# Patient Record
Sex: FEMALE | Race: WHITE | Employment: UNEMPLOYED | URBAN - METROPOLITAN AREA
[De-identification: names, ages, dates, MRNs, and addresses within clinical notes are randomized per-mention and may not be internally consistent; named-entity substitution may affect disease eponyms.]

---

## 2018-06-14 PROCEDURE — 88305 TISSUE EXAM BY PATHOLOGIST: CPT | Performed by: INTERNAL MEDICINE

## 2018-06-15 ENCOUNTER — HOSPITAL ENCOUNTER (OUTPATIENT)
Dept: LAB | Age: 83
Discharge: HOME | End: 2018-06-15

## 2019-06-11 ENCOUNTER — HOSPITAL ENCOUNTER (OUTPATIENT)
Dept: MRI IMAGING | Age: 84
Discharge: HOME | End: 2019-06-11
Attending: OTOLARYNGOLOGY
Payer: MEDICARE

## 2019-06-11 DIAGNOSIS — H93.11 TINNITUS, RIGHT EAR: ICD-10-CM

## 2019-06-11 PROCEDURE — A9575 INJ GADOTERATE MEGLUMI 0.1ML: HCPCS | Performed by: OTOLARYNGOLOGY

## 2019-06-11 PROCEDURE — 70553 MRI BRAIN STEM W/O & W/DYE: CPT

## 2019-06-11 PROCEDURE — 74011250636 HC RX REV CODE- 250/636: Performed by: OTOLARYNGOLOGY

## 2019-06-11 RX ORDER — SODIUM CHLORIDE 0.9 % (FLUSH) 0.9 %
10 SYRINGE (ML) INJECTION
Status: COMPLETED | OUTPATIENT
Start: 2019-06-11 | End: 2019-06-11

## 2019-06-11 RX ORDER — GADOTERATE MEGLUMINE 376.9 MG/ML
12 INJECTION INTRAVENOUS
Status: COMPLETED | OUTPATIENT
Start: 2019-06-11 | End: 2019-06-11

## 2019-06-11 RX ADMIN — Medication 10 ML: at 18:31

## 2019-06-11 RX ADMIN — GADOTERATE MEGLUMINE 12 ML: 376.9 INJECTION INTRAVENOUS at 18:31

## 2021-07-15 ENCOUNTER — HOSPICE ADMISSION (OUTPATIENT)
Dept: HOSPICE | Facility: HOSPICE | Age: 86
End: 2021-07-15

## 2021-07-18 ENCOUNTER — HOME CARE VISIT (OUTPATIENT)
Dept: HOSPICE | Facility: HOSPICE | Age: 86
End: 2021-07-18

## 2021-07-20 ENCOUNTER — HOSPITAL ENCOUNTER (OUTPATIENT)
Age: 86
Setting detail: OBSERVATION
Discharge: ANOTHER HOSPITAL | End: 2021-07-20
Attending: EMERGENCY MEDICINE | Admitting: EMERGENCY MEDICINE
Payer: MEDICARE

## 2021-07-20 ENCOUNTER — APPOINTMENT (OUTPATIENT)
Dept: CT IMAGING | Age: 86
End: 2021-07-20
Attending: EMERGENCY MEDICINE
Payer: MEDICARE

## 2021-07-20 ENCOUNTER — HOSPITAL ENCOUNTER (OUTPATIENT)
Age: 86
Setting detail: OBSERVATION
Discharge: HOSPICE | End: 2021-07-23
Attending: HOSPITALIST | Admitting: HOSPITALIST
Payer: MEDICARE

## 2021-07-20 VITALS
DIASTOLIC BLOOD PRESSURE: 53 MMHG | HEART RATE: 88 BPM | TEMPERATURE: 97.5 F | SYSTOLIC BLOOD PRESSURE: 105 MMHG | RESPIRATION RATE: 18 BRPM | OXYGEN SATURATION: 100 %

## 2021-07-20 DIAGNOSIS — R11.0 NAUSEA WITHOUT VOMITING: ICD-10-CM

## 2021-07-20 DIAGNOSIS — K59.00 CONSTIPATION, UNSPECIFIED CONSTIPATION TYPE: ICD-10-CM

## 2021-07-20 DIAGNOSIS — K92.2 GASTROINTESTINAL HEMORRHAGE, UNSPECIFIED GASTROINTESTINAL HEMORRHAGE TYPE: Primary | ICD-10-CM

## 2021-07-20 DIAGNOSIS — R10.13 EPIGASTRIC PAIN: ICD-10-CM

## 2021-07-20 DIAGNOSIS — C16.9 MALIGNANT NEOPLASM OF STOMACH, UNSPECIFIED LOCATION (HCC): ICD-10-CM

## 2021-07-20 DIAGNOSIS — Z51.5 ENCOUNTER FOR PALLIATIVE CARE: ICD-10-CM

## 2021-07-20 DIAGNOSIS — D62 ACUTE BLOOD LOSS ANEMIA: ICD-10-CM

## 2021-07-20 PROBLEM — R53.1 WEAKNESS: Status: ACTIVE | Noted: 2021-07-20

## 2021-07-20 LAB
ALBUMIN SERPL-MCNC: 1.5 G/DL (ref 3.2–4.6)
ALBUMIN/GLOB SERPL: 0.4 {RATIO} (ref 1.2–3.5)
ALP SERPL-CCNC: 91 U/L (ref 50–136)
ALT SERPL-CCNC: 12 U/L (ref 12–65)
ANION GAP SERPL CALC-SCNC: 7 MMOL/L (ref 7–16)
AST SERPL-CCNC: 39 U/L (ref 15–37)
BASOPHILS # BLD: 0.1 K/UL (ref 0–0.2)
BASOPHILS NFR BLD: 0 % (ref 0–2)
BILIRUB SERPL-MCNC: 0.3 MG/DL (ref 0.2–1.1)
BUN SERPL-MCNC: 18 MG/DL (ref 8–23)
CALCIUM SERPL-MCNC: 7.4 MG/DL (ref 8.3–10.4)
CHLORIDE SERPL-SCNC: 105 MMOL/L (ref 98–107)
CO2 SERPL-SCNC: 21 MMOL/L (ref 21–32)
CREAT SERPL-MCNC: 0.62 MG/DL (ref 0.6–1)
DIFFERENTIAL METHOD BLD: ABNORMAL
EOSINOPHIL # BLD: 0.1 K/UL (ref 0–0.8)
EOSINOPHIL NFR BLD: 0 % (ref 0.5–7.8)
ERYTHROCYTE [DISTWIDTH] IN BLOOD BY AUTOMATED COUNT: 17.7 % (ref 11.9–14.6)
GLOBULIN SER CALC-MCNC: 3.6 G/DL (ref 2.3–3.5)
GLUCOSE SERPL-MCNC: 120 MG/DL (ref 65–100)
HCT VFR BLD AUTO: 25.8 % (ref 35.8–46.3)
HGB BLD-MCNC: 7.6 G/DL (ref 11.7–15.4)
IMM GRANULOCYTES # BLD AUTO: 0.2 K/UL (ref 0–0.5)
IMM GRANULOCYTES NFR BLD AUTO: 1 % (ref 0–5)
LIPASE SERPL-CCNC: 155 U/L (ref 73–393)
LYMPHOCYTES # BLD: 1 K/UL (ref 0.5–4.6)
LYMPHOCYTES NFR BLD: 5 % (ref 13–44)
MCH RBC QN AUTO: 30.6 PG (ref 26.1–32.9)
MCHC RBC AUTO-ENTMCNC: 29.5 G/DL (ref 31.4–35)
MCV RBC AUTO: 104 FL (ref 79.6–97.8)
MONOCYTES # BLD: 1.5 K/UL (ref 0.1–1.3)
MONOCYTES NFR BLD: 7 % (ref 4–12)
NEUTS SEG # BLD: 17.2 K/UL (ref 1.7–8.2)
NEUTS SEG NFR BLD: 86 % (ref 43–78)
NRBC # BLD: 0 K/UL (ref 0–0.2)
PLATELET # BLD AUTO: 394 K/UL (ref 150–450)
PMV BLD AUTO: 9.2 FL (ref 9.4–12.3)
POTASSIUM SERPL-SCNC: 4.5 MMOL/L (ref 3.5–5.1)
PROT SERPL-MCNC: 5.1 G/DL (ref 6.3–8.2)
RBC # BLD AUTO: 2.48 M/UL (ref 4.05–5.2)
SODIUM SERPL-SCNC: 133 MMOL/L (ref 136–145)
WBC # BLD AUTO: 20 K/UL (ref 4.3–11.1)

## 2021-07-20 PROCEDURE — 96361 HYDRATE IV INFUSION ADD-ON: CPT

## 2021-07-20 PROCEDURE — 86901 BLOOD TYPING SEROLOGIC RH(D): CPT

## 2021-07-20 PROCEDURE — 36415 COLL VENOUS BLD VENIPUNCTURE: CPT

## 2021-07-20 PROCEDURE — 65270000029 HC RM PRIVATE

## 2021-07-20 PROCEDURE — 74011000636 HC RX REV CODE- 636: Performed by: EMERGENCY MEDICINE

## 2021-07-20 PROCEDURE — 83690 ASSAY OF LIPASE: CPT

## 2021-07-20 PROCEDURE — 99218 HC RM OBSERVATION: CPT

## 2021-07-20 PROCEDURE — 85025 COMPLETE CBC W/AUTO DIFF WBC: CPT

## 2021-07-20 PROCEDURE — 74011250636 HC RX REV CODE- 250/636: Performed by: EMERGENCY MEDICINE

## 2021-07-20 PROCEDURE — 99285 EMERGENCY DEPT VISIT HI MDM: CPT

## 2021-07-20 PROCEDURE — 96360 HYDRATION IV INFUSION INIT: CPT

## 2021-07-20 PROCEDURE — 80053 COMPREHEN METABOLIC PANEL: CPT

## 2021-07-20 PROCEDURE — 74011000258 HC RX REV CODE- 258: Performed by: EMERGENCY MEDICINE

## 2021-07-20 PROCEDURE — 74177 CT ABD & PELVIS W/CONTRAST: CPT

## 2021-07-20 PROCEDURE — 94660 CPAP INITIATION&MGMT: CPT

## 2021-07-20 RX ORDER — SODIUM CHLORIDE 0.9 % (FLUSH) 0.9 %
10 SYRINGE (ML) INJECTION
Status: COMPLETED | OUTPATIENT
Start: 2021-07-20 | End: 2021-07-20

## 2021-07-20 RX ORDER — SODIUM CHLORIDE 0.9 % (FLUSH) 0.9 %
5-10 SYRINGE (ML) INJECTION EVERY 8 HOURS
Status: DISCONTINUED | OUTPATIENT
Start: 2021-07-20 | End: 2021-07-20 | Stop reason: HOSPADM

## 2021-07-20 RX ORDER — SODIUM CHLORIDE 0.9 % (FLUSH) 0.9 %
5-10 SYRINGE (ML) INJECTION AS NEEDED
Status: DISCONTINUED | OUTPATIENT
Start: 2021-07-20 | End: 2021-07-20 | Stop reason: HOSPADM

## 2021-07-20 RX ADMIN — SODIUM CHLORIDE 250 ML/HR: 9 INJECTION, SOLUTION INTRAVENOUS at 15:43

## 2021-07-20 RX ADMIN — Medication 10 ML: at 17:44

## 2021-07-20 RX ADMIN — DIATRIZOATE MEGLUMINE AND DIATRIZOATE SODIUM 15 ML: 660; 100 LIQUID ORAL; RECTAL at 15:44

## 2021-07-20 RX ADMIN — SODIUM CHLORIDE 100 ML: 900 INJECTION, SOLUTION INTRAVENOUS at 17:44

## 2021-07-20 RX ADMIN — IOPAMIDOL 100 ML: 755 INJECTION, SOLUTION INTRAVENOUS at 17:44

## 2021-07-20 NOTE — ED TRIAGE NOTES
Arrives via ems from home. Hx colon cx. Pt having constipation x4 days. Attempted enema and seen blood in stool. Pt also reports lower abdominal pain and generalized weakness.    EMS /60 HR 90 O2 100 %  Pt reports dark tarry stools x1 month

## 2021-07-20 NOTE — ED PROVIDER NOTES
69-year-old female with a history of gastric adenocarcinoma presents with dark stools for the past 3 weeks with worsening generalized weakness and lightheadedness. All of her prior care is through [facility], but states she wanted to come to our facility because she is [address]. She tried an enema for constipation and noticed some bright red blood. She has diffuse abdominal pain. She has required multiple transfusions in the past due to ongoing bleeding from gastric cancer. According to the last note, she has recently been referred to hospice. She did not follow through with this because she wants to go home to [facility] [place]. Family plans on flying her home in 2 days. She denies fever, cough, shortness of breath, chest pain, diarrhea, vomiting. Oncology note Dr Dione Dozier 7/14/21:  Ra Story is a pleasant 81 y.o. female who presents to the [facility] for follow up of gastric adenocarcinoma. She continues to have difficulty hearing (left > right) secondary to bilateral tinnitus. She also continues to experience intermittent abdominal pain with black stools. She has an appointment with Dr. Rima Pop on 7/22/21 to discuss radiation to her known gastric mass. She remains on Xeloda but she feels like it may be making her symptoms worse. She also remains on Eliquis 2.5mg BID. She notes that she has felt generally weak at times she she says that it difficult for her to ambulate. She currently lives alone. She denies recent falls. She voices no additional complaints or concerns at this time. ASSESSMENT AND PLAN:  Gastric adenocarcinoma ([facility] [address] (Primary)  Assessment & Plan:  Locally advanced gastric adenocarcinoma, HER2 negative. She started FOLFOX but developed PAC thrombosis with associated pain/swelling that did not improve on Eliquis. Her PAC was subsequently removed. She was receiving XELOX, but developed acute oxaliplatin reaction with cramps and difficulty walking.  She is now on Xeloda monotherapy. We had a long discussion today about goals of care. She is feeling weaker, and isn't a good candidate for infusional chemotherapy. She would really like to get back to [facility] to spend time with her family. Alternatively, we could get Hospice involved to help with her care here. After our discussion, she would like to have Hospice here in [facility]. I'll make these arrangements today. Gastrointestinal hemorrhage, unspecified gastrointestinal hemorrhage type  Assessment & Plan:  She previously required PRBC transfusions and IV iron BEFORE requiring Eliquis, so I suspect any occult GI hemorrhage from her gastric mass will be exacerbated by her anticoagulation. I am concerned she could suffer life-threatening hemorrhage and we have discussed this in detail. S/p repeat IV iron for recurrent BRANDI. I decreased her Eliquis to 2.5 mg po BID several weeks ago. Iron deficiency anemia due to chronic blood loss  Assessment & Plan:  Likely due to gastric adenoCA associated bleeding. Worsened due to requirement for Eliquis. S/p prior transfusion and recent repeat IV iron. Decreased Eliquis to 2.5 mg po BID. 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 MRI unrevealing. Refer to ENT for evaluation. Xeloda is unlikely to be the culprit based on my experience and literature search on this possible association. \"          Abdominal Pain   Pertinent negatives include no fever, no diarrhea, no nausea, no vomiting, no headaches, no chest pain and no back pain. No past medical history on file. No past surgical history on file. No family history on file.     Social History     Socioeconomic History    Marital status:      Spouse name: Not on file    Number of children: Not on file    Years of education: Not on file    Highest education level: Not on file   Occupational History    Not on file   Tobacco Use    Smoking status: Never Smoker    Smokeless tobacco: Never Used   Substance and Sexual Activity    Alcohol use: Not Currently    Drug use: Not on file    Sexual activity: Not on file   Other Topics Concern    Not on file   Social History Narrative    Not on file     Social Determinants of Health     Financial Resource Strain:     Difficulty of Paying Living Expenses:    Food Insecurity:     Worried About Running Out of Food in the Last Year:     [address] in the Last Year:    Transportation Needs:     Lack of Transportation (Medical):  Lack of Transportation (Non-Medical):    Physical Activity:     Days of Exercise per Week:     Minutes of Exercise per Session:    Stress:     Feeling of Stress :    Social Connections:     Frequency of Communication with Friends and Family:     Frequency of Social Gatherings with Friends and Family:     Attends Taoism Services:     Active Member of Clubs or Organizations:     Attends Club or Organization Meetings:     Marital Status:    Intimate Partner Violence:     Fear of Current or Ex-Partner:     Emotionally Abused:     Physically Abused:     Sexually Abused: ALLERGIES: Atorvastatin calcium, Carvedilol, Clonazepam, Duloxetine hcl, Ezetimibe, Fluoxetine hcl, Gabapentin, Olanzapine, Pregabalin, Sertraline, Simvastatin, Colesevelam, Ezetimibe-simvastatin, Fenofibrate, Olanzapine-fluoxetine, and Penicillins    Review of Systems   Constitutional: Positive for fatigue. Negative for fever. HENT: Negative for hearing loss. Eyes: Negative for visual disturbance. Respiratory: Negative for cough and shortness of breath. Cardiovascular: Negative for chest pain. Gastrointestinal: Positive for abdominal pain and blood in stool. Negative for diarrhea, nausea and vomiting. Musculoskeletal: Negative for back pain. Skin: Negative for rash. Neurological: Negative for headaches. Psychiatric/Behavioral: Positive for dysphoric mood. Negative for confusion. All other systems reviewed and are negative. Vitals:    07/20/21 1245 07/20/21 1252   BP: 104/61    Pulse: 100 97   Resp: 18    Temp: 97.5 °F (36.4 °C)    SpO2: 98%             Physical Exam  Vitals and nursing note reviewed. Constitutional:       Appearance: Normal appearance. She is well-developed. HENT:      Head: Normocephalic and atraumatic. Nose: Nose normal.      Mouth/Throat:      Mouth: Mucous membranes are moist.   Eyes:      Pupils: Pupils are equal, round, and reactive to light. Cardiovascular:      Rate and Rhythm: Regular rhythm. Heart sounds: Normal heart sounds. Pulmonary:      Effort: Pulmonary effort is normal.      Breath sounds: Normal breath sounds. Abdominal:      Palpations: Abdomen is soft. Tenderness: There is generalized abdominal tenderness. Musculoskeletal:         General: No deformity. Normal range of motion. Cervical back: Normal range of motion and neck supple. Skin:     General: Skin is warm and dry. Coloration: Skin is pale. Neurological:      General: No focal deficit present. Mental Status: She is alert. Mental status is at baseline. Psychiatric:         Mood and Affect: Mood is depressed. Behavior: Behavior normal.          MDM  Number of Diagnoses or Management Options  Diagnosis management comments: Parts of this document were created using dragon voice recognition software. The chart has been reviewed but errors may still be present. I wore appropriate PPE throughout this patient's ED visit. Patricia Matthews MD, 3:15 PM    6:49 PM  Hb decreased from 9.3 on 7/14/21 to 7.6 today. No SBO on CT. dw hospitalist for admission.  Lives alone       Amount and/or Complexity of Data Reviewed  Clinical lab tests: ordered and reviewed (Results for orders placed or performed during the hospital encounter of 39/76/68  -METABOLIC PANEL, COMPREHENSIVE       Result                      Value             Ref Range           Sodium                      133 (L)           136 - 145 mm*       Potassium                   4.5               3.5 - 5.1 mm*       Chloride                    105               98 - 107 mmo*       CO2                         21                21 - 32 mmol*       Anion gap                   7                 7 - 16 mmol/L       Glucose                     120 (H)           65 - 100 mg/*       BUN                         18                8 - 23 MG/DL        Creatinine                  0.62              0.6 - 1.0 MG*       GFR est AA                  >60               >60 ml/min/1*       GFR est non-AA              >60               >60 ml/min/1*       Calcium                     7.4 (L)           8.3 - 10.4 M*       Bilirubin, total            0.3               0.2 - 1.1 MG*       ALT (SGPT)                  12                12 - 65 U/L         AST (SGOT)                  39 (H)            15 - 37 U/L         Alk.  phosphatase            91                50 - 136 U/L        Protein, total              5.1 (L)           6.3 - 8.2 g/*       Albumin                     1.5 (L)           3.2 - 4.6 g/*       Globulin                    3.6 (H)           2.3 - 3.5 g/*       A-G Ratio                   0.4 (L)           1.2 - 3.5      -LIPASE       Result                      Value             Ref Range           Lipase                      155               73 - 393 U/L   -CBC WITH AUTOMATED DIFF       Result                      Value             Ref Range           WBC                         20.0 (H)          4.3 - 11.1 K*       RBC                         2.48 (L)          4.05 - 5.2 M*       HGB                         7.6 (L)           11.7 - 15.4 *       HCT                         25.8 (L)          35.8 - 46.3 %       MCV                         104.0 (H)         79.6 - 97.8 *       MCH                         30.6              26.1 - 32.9 *       MCHC                        29.5 (L)          31.4 - 35.0 *       RDW                         17.7 (H)          11.9 - 14.6 %       PLATELET                    394               150 - 450 K/*       MPV                         9.2 (L)           9.4 - 12.3 FL       ABSOLUTE NRBC               0.00              0.0 - 0.2 K/*       DF                          AUTOMATED                             NEUTROPHILS                 86 (H)            43 - 78 %           LYMPHOCYTES                 5 (L)             13 - 44 %           MONOCYTES                   7                 4.0 - 12.0 %        EOSINOPHILS                 0 (L)             0.5 - 7.8 %         BASOPHILS                   0                 0.0 - 2.0 %         IMMATURE GRANULOCYTES       1                 0.0 - 5.0 %         ABS. NEUTROPHILS            17.2 (H)          1.7 - 8.2 K/*       ABS. LYMPHOCYTES            1.0               0.5 - 4.6 K/*       ABS. MONOCYTES              1.5 (H)           0.1 - 1.3 K/*       ABS. EOSINOPHILS            0.1               0.0 - 0.8 K/*       ABS. BASOPHILS              0.1               0.0 - 0.2 K/*       ABS. IMM.  GRANS.            0.2               0.0 - 0.5 K/*  -TYPE & SCREEN       Result                      Value             Ref Range           Crossmatch Expiration                                         07/23/2021,2359       ABO/Rh(D)                   O POSITIVE                            Antibody screen             NEG                              )  Tests in the medicine section of CPT®: ordered and reviewed           Procedures

## 2021-07-21 PROBLEM — D64.9 SYMPTOMATIC ANEMIA: Status: ACTIVE | Noted: 2021-07-21

## 2021-07-21 PROBLEM — D62 ACUTE BLOOD LOSS ANEMIA: Status: ACTIVE | Noted: 2021-07-21

## 2021-07-21 PROBLEM — C16.9 GASTRIC CANCER (HCC): Status: ACTIVE | Noted: 2021-07-21

## 2021-07-21 LAB
HCT VFR BLD AUTO: 27 % (ref 35.8–46.3)
HGB BLD-MCNC: 8.3 G/DL (ref 11.7–15.4)
HISTORY CHECKED?,CKHIST: NORMAL

## 2021-07-21 PROCEDURE — P9016 RBC LEUKOCYTES REDUCED: HCPCS

## 2021-07-21 PROCEDURE — 99222 1ST HOSP IP/OBS MODERATE 55: CPT | Performed by: NURSE PRACTITIONER

## 2021-07-21 PROCEDURE — 96376 TX/PRO/DX INJ SAME DRUG ADON: CPT

## 2021-07-21 PROCEDURE — 74011000250 HC RX REV CODE- 250: Performed by: HOSPITALIST

## 2021-07-21 PROCEDURE — 74011000258 HC RX REV CODE- 258: Performed by: STUDENT IN AN ORGANIZED HEALTH CARE EDUCATION/TRAINING PROGRAM

## 2021-07-21 PROCEDURE — 86923 COMPATIBILITY TEST ELECTRIC: CPT

## 2021-07-21 PROCEDURE — 99218 HC RM OBSERVATION: CPT

## 2021-07-21 PROCEDURE — 96374 THER/PROPH/DIAG INJ IV PUSH: CPT

## 2021-07-21 PROCEDURE — 2709999900 HC NON-CHARGEABLE SUPPLY

## 2021-07-21 PROCEDURE — 36415 COLL VENOUS BLD VENIPUNCTURE: CPT

## 2021-07-21 PROCEDURE — 86901 BLOOD TYPING SEROLOGIC RH(D): CPT

## 2021-07-21 PROCEDURE — 85014 HEMATOCRIT: CPT

## 2021-07-21 PROCEDURE — 76450000000

## 2021-07-21 PROCEDURE — 96375 TX/PRO/DX INJ NEW DRUG ADDON: CPT

## 2021-07-21 PROCEDURE — 74011250637 HC RX REV CODE- 250/637: Performed by: HOSPITALIST

## 2021-07-21 PROCEDURE — C9113 INJ PANTOPRAZOLE SODIUM, VIA: HCPCS | Performed by: HOSPITALIST

## 2021-07-21 PROCEDURE — 74011250636 HC RX REV CODE- 250/636: Performed by: HOSPITALIST

## 2021-07-21 PROCEDURE — 74011250636 HC RX REV CODE- 250/636: Performed by: STUDENT IN AN ORGANIZED HEALTH CARE EDUCATION/TRAINING PROGRAM

## 2021-07-21 PROCEDURE — 36430 TRANSFUSION BLD/BLD COMPNT: CPT

## 2021-07-21 RX ORDER — ONDANSETRON 4 MG/1
4 TABLET, ORALLY DISINTEGRATING ORAL
Status: DISCONTINUED | OUTPATIENT
Start: 2021-07-21 | End: 2021-07-23 | Stop reason: HOSPADM

## 2021-07-21 RX ORDER — FLUOXETINE 10 MG/1
20 CAPSULE ORAL DAILY
Status: DISCONTINUED | OUTPATIENT
Start: 2021-07-22 | End: 2021-07-23 | Stop reason: HOSPADM

## 2021-07-21 RX ORDER — ONDANSETRON 2 MG/ML
4 INJECTION INTRAMUSCULAR; INTRAVENOUS
Status: DISCONTINUED | OUTPATIENT
Start: 2021-07-21 | End: 2021-07-23 | Stop reason: HOSPADM

## 2021-07-21 RX ORDER — FUROSEMIDE 20 MG/1
20 TABLET ORAL DAILY
COMMUNITY
End: 2021-07-23

## 2021-07-21 RX ORDER — SODIUM CHLORIDE 0.9 % (FLUSH) 0.9 %
5-40 SYRINGE (ML) INJECTION EVERY 8 HOURS
Status: DISCONTINUED | OUTPATIENT
Start: 2021-07-21 | End: 2021-07-23 | Stop reason: HOSPADM

## 2021-07-21 RX ORDER — SODIUM CHLORIDE 9 MG/ML
250 INJECTION, SOLUTION INTRAVENOUS AS NEEDED
Status: DISCONTINUED | OUTPATIENT
Start: 2021-07-21 | End: 2021-07-21

## 2021-07-21 RX ORDER — SODIUM CHLORIDE 9 MG/ML
250 INJECTION, SOLUTION INTRAVENOUS AS NEEDED
Status: DISCONTINUED | OUTPATIENT
Start: 2021-07-21 | End: 2021-07-23 | Stop reason: HOSPADM

## 2021-07-21 RX ORDER — ACETAMINOPHEN 325 MG/1
650 TABLET ORAL
Status: DISCONTINUED | OUTPATIENT
Start: 2021-07-21 | End: 2021-07-23 | Stop reason: HOSPADM

## 2021-07-21 RX ORDER — ACETAMINOPHEN 650 MG/1
650 SUPPOSITORY RECTAL
Status: DISCONTINUED | OUTPATIENT
Start: 2021-07-21 | End: 2021-07-23 | Stop reason: HOSPADM

## 2021-07-21 RX ORDER — POLYETHYLENE GLYCOL 3350 17 G/17G
17 POWDER, FOR SOLUTION ORAL DAILY PRN
Status: DISCONTINUED | OUTPATIENT
Start: 2021-07-21 | End: 2021-07-23 | Stop reason: HOSPADM

## 2021-07-21 RX ORDER — LEVOTHYROXINE SODIUM 100 UG/1
100 TABLET ORAL
Status: DISCONTINUED | OUTPATIENT
Start: 2021-07-21 | End: 2021-07-23 | Stop reason: HOSPADM

## 2021-07-21 RX ORDER — FLUOXETINE 10 MG/1
10 CAPSULE ORAL DAILY
Status: DISCONTINUED | OUTPATIENT
Start: 2021-07-21 | End: 2021-07-21

## 2021-07-21 RX ORDER — SODIUM CHLORIDE 0.9 % (FLUSH) 0.9 %
5-40 SYRINGE (ML) INJECTION AS NEEDED
Status: DISCONTINUED | OUTPATIENT
Start: 2021-07-21 | End: 2021-07-23 | Stop reason: HOSPADM

## 2021-07-21 RX ORDER — LOSARTAN POTASSIUM 25 MG/1
25 TABLET ORAL DAILY
Status: DISCONTINUED | OUTPATIENT
Start: 2021-07-21 | End: 2021-07-21

## 2021-07-21 RX ORDER — OXYCODONE HYDROCHLORIDE 5 MG/1
10 TABLET ORAL
Status: DISCONTINUED | OUTPATIENT
Start: 2021-07-21 | End: 2021-07-23 | Stop reason: HOSPADM

## 2021-07-21 RX ADMIN — SODIUM CHLORIDE 40 MG: 9 INJECTION, SOLUTION INTRAMUSCULAR; INTRAVENOUS; SUBCUTANEOUS at 09:11

## 2021-07-21 RX ADMIN — SODIUM CHLORIDE 125 MG: 9 INJECTION, SOLUTION INTRAVENOUS at 14:51

## 2021-07-21 RX ADMIN — LEVOTHYROXINE SODIUM 100 MCG: 0.1 TABLET ORAL at 06:15

## 2021-07-21 RX ADMIN — ONDANSETRON 4 MG: 4 TABLET, ORALLY DISINTEGRATING ORAL at 10:13

## 2021-07-21 RX ADMIN — Medication 10 ML: at 06:14

## 2021-07-21 RX ADMIN — SODIUM CHLORIDE 80 MG: 9 INJECTION, SOLUTION INTRAMUSCULAR; INTRAVENOUS; SUBCUTANEOUS at 00:57

## 2021-07-21 RX ADMIN — Medication 10 ML: at 00:58

## 2021-07-21 RX ADMIN — Medication 10 ML: at 15:11

## 2021-07-21 RX ADMIN — OXYCODONE 10 MG: 5 TABLET ORAL at 00:57

## 2021-07-21 RX ADMIN — OXYCODONE 10 MG: 5 TABLET ORAL at 23:44

## 2021-07-21 NOTE — PROGRESS NOTES
07/20/21 7183   Dual Skin Pressure Injury Assessment   Dual Skin Pressure Injury Assessment WDL   Second Care Provider (Based on [address] [place] [address]) Migdalia Goldmann, RN    Skin Integumentary   Skin Integumentary (WDL) WDL    Pressure  Injury Documentation No Pressure Injury Noted-Pressure Ulcer Prevention Initiated

## 2021-07-21 NOTE — ED NOTES
TRANSFER - OUT REPORT:    Verbal report given on Nicanor Riley  being transferred to [facility](unit) for routine progression of care       Report consisted of patients Situation, Background, Assessment and   Recommendations(SBAR). Information from the following report(s) SBAR was reviewed with the receiving nurse. Lines:   Peripheral IV 07/20/21 Left Arm (Active)   Site Assessment Clean, dry, & intact 07/20/21 1509   Phlebitis Assessment 0 07/20/21 1509   Infiltration Assessment 0 07/20/21 1509   Dressing Status Clean, dry, & intact 07/20/21 1509        Opportunity for questions and clarification was provided.       Patient transported with:   [employer]ig [employer]

## 2021-07-21 NOTE — PROGRESS NOTES
Blood consent form signed and in chart. Patient had small amount of emesis, reddish tinge and coffee ground. Dr. Bandar Balderas informed.

## 2021-07-21 NOTE — CONSULTS
Palliative Care    Patient: Venu Maurice MRN: [id number]  SSN: [id number]    YOB: 1935  Age: 81 y.o. Sex: female       Date of Request: 7/21/2021  Date of Consult:  7/21/2021  Reason for Consult:  end stage disease  Requesting Physician: Dr. Christina Smith     Assessment/Plan:     Principal Diagnosis:    Nausea/Vomiting  R11.2  Additional Diagnoses:   · Pain, abdomen  R10.9  · Counseling, Encounter for Medical Advice  Z71.9  · Encounter for Palliative Care  Z51.5    Palliative Performance Scale (PPS):  PPS: 60    Medical Decision Making:   Reviewed and summarized chart from admission to present. Discussed case with appropriate providers: primary RN; Solitario Dave MDiv  Reviewed laboratory and x-ray data. Patient resting in bed, no visitor present. Patient reports mild nausea; no Zofran given recently, will ask RN to give prn Zofran. She reports her pain is resolved. She received one dose of oxycodone around 0100. Patient shares of her desire to go home to [facility] for end of life care. Her cousin is coming today or tomorrow, and she hopes to leave for [facility] in about a week. I attempted to discuss hospice with her in case she does not feel well enough to make this trip. She declines, adamant that she wants to return to [facility]. Validated patient's hope. Patient brings up her wishes regarding resuscitation, and states that she does not want chest compression or to be placed on a ventilator. She is agreeable to DNR status in her EMR. She expresses desire to receive Sacrament of the Sick by Father Surinder Hussein at [facility], as she has known him for years. This request was communicated with spiritual care team.    Patient states that two friends will be visiting later today, and she would like to discuss things with them. Recommend further education on benefits of having home hospice in place, even for the next week, and then discharging when she leaves for [facility].       Will discuss findings with members of the interdisciplinary team.      Thank you for this referral.          .    Subjective:     History obtained from:  Patient, Care Provider and Chart    Chief Complaint: Nausea  History of Present Illness:  Ms. Wu Onofre is an 80 yo female with PMH of gastric cancer, previously treated at [facility]. She is no longer receiving chemotherapy and plans to go to [facility] for end of life care. She presented to [facility] ER on 7/20 with 4 day history of constipation and blood in her stool. CT showed known gastric mass. Patient with hgb of 7.6. She was admitted for further management. Advance Directive: Yes       Code Status:  DNR            Health Care Power of : Yes - Copy of [address] on file. No past medical history on file. No past surgical history on file. No family history on file. Social History     Tobacco Use    Smoking status: Never Smoker    Smokeless tobacco: Never Used   Substance Use Topics    Alcohol use: Not Currently     Prior to Admission medications    Medication Sig Start Date End Date Taking? Authorizing Provider   furosemide (Lasix) 20 mg tablet Take 20 mg by mouth daily. Indications: visible water retention   Yes Provider, Historical   levothyroxine (SYNTHROID) 100 mcg tablet Take 80 mcg by mouth Daily (before breakfast). Yes Other, MD Ed   losartan (COZAAR) 25 mg tablet Take  by mouth daily. Patient not taking: Reported on 7/21/2021    Nubia, MD Ed   FLUoxetine (PROZAC) 20 mg capsule Take  by mouth daily.   Patient not taking: Reported on 7/21/2021    Other, MD Ed       Allergies   Allergen Reactions    Atorvastatin Calcium Myalgia     Muscle pain    Carvedilol Other (comments)     Pain and numbness in feet    Clonazepam Myalgia     Muscle pain    Duloxetine Hcl Nausea and Vomiting     Gi upset    Ezetimibe Myalgia     Muscle cramps    Fluoxetine Hcl Other (comments)     Dry mouth    Gabapentin Other (comments)     ineffective    Olanzapine Other (comments)     Dry mouth    Pregabalin Other (comments)     confusion    Sertraline Other (comments)     Anxiety,throbbing    Simvastatin Myalgia     Muscle aches and pain    Colesevelam Other (comments)     Heartburn    Ezetimibe-Simvastatin Myalgia     Muscle cramps    Fenofibrate Rash    Olanzapine-Fluoxetine Other (comments)     Dry mouth    Penicillins Rash        Review of Systems:  A comprehensive review of systems was negative except for:   Gastrointestinal: Positive for abdominal pain (controlled) and nausea. Objective:     Visit Vitals  BP (!) 95/53 (BP 1 Location: Right upper arm, BP Patient Position: At rest)   Pulse 76   Temp 97.6 °F (36.4 °C)   Resp 18   SpO2 98%        Physical Exam:    General:  Cooperative. No acute distress. Eyes:  Conjunctivae/corneas clear. Nose: Nares normal. Septum midline. Neck: Supple, symmetrical, trachea midline. Lungs:   Unlabored   Heart:  Regular rate and rhythm. Abdomen:   Soft, non-tender, non-distended. Extremities: Normal, atraumatic, no cyanosis or edema. Skin: Skin color, texture, turgor normal. No rash. Neurologic: Nonfocal.   Psych: Alert and oriented.       Assessment:     Hospital Problems  Date Reviewed: 7/10/2019        Codes Class Noted POA    Acute blood loss anemia ICD-10-CM: D62  ICD-9-CM: 285.1  7/21/2021 Unknown        [facility] ICD-10-CM: C16.9  ICD-9-CM: 151.9  7/21/2021 Unknown        Symptomatic anemia ICD-10-CM: D64.9  ICD-9-CM: 285.9  7/21/2021 Unknown              Signed By: Sonia Medellin NP     July 21, 2021

## 2021-07-21 NOTE — PROGRESS NOTES
TRANSFER - IN REPORT:    Verbal report received from Wilfredo Tavares [facility] (name) on Cale Olmedo  being received from [facility] ER (unit) for routine progression of care      Report consisted of patients Situation, Background, Assessment and   Recommendations(SBAR). Information from the following report(s) SBAR, Kardex, ED Summary, [facility] and Recent Results was reviewed with the receiving nurse. Opportunity for questions and clarification was provided. Assessment completed upon patients arrival to unit and care assumed.

## 2021-07-21 NOTE — PROGRESS NOTES
Problem: Falls - Risk of  Goal: *Absence of Falls  Description: Document [place] Fall Risk and appropriate interventions in the flowsheet.   Outcome: Progressing Towards Goal  Note: Fall Risk Interventions:  Mobility Interventions: Patient to call before getting OOB         Medication Interventions: Patient to call before getting OOB    Elimination Interventions: Patient to call for help with toileting needs, Call light in reach

## 2021-07-21 NOTE — PROGRESS NOTES
Hospitalist Progress Note     Name:  Kristi Beltran  Age:85 y.o. Sex:female   :  1935    MRN:  [id number]   Admit Date:  2021    Presenting Complaint: No chief complaint on file. Initial Admission Diagnosis: Symptomatic anemia [D64.9]  Gastric cancer (HCC) [C16.9]  Acute blood loss anemia [D62]     Hospital Course/Interval history:     81years old female with history of gastric adenocarcinoma, history of chemotherapy for gastric cancer, currently decided on hospice, currently getting hospice at home presented to emergency room with generalized weakness going on for past 2 weeks duration. Still having dark-colored stools around the same amount. Patient reports she was following at [facility], was getting chemotherapy but not getting any chemotherapy anymore as patient is here for hospice. Main reason for visit today is generalized weakness, would like to get a blood transfusion to improve her hemoglobin so she could fly back to her country to spend the rest of time there. Denies any chest pain, shortness of breath. Denies any nausea, vomiting, hematemesis. Denies any hematuria. Patient was initially evaluated at [facility] emergency room, due to lack of beds patient is transferred here. Subjective (21):    Seen and examined at bedside this morning. No acute vents overnight. Patient does admit to slight lightheadedness, dizziness, fatigue and shortness of breath. She denies any chest pain or pressure. She states that her abdominal pain is fairly well controlled. She did have one episode of emesis with small amount of blood present this morning. She has been having melanotic stools but denies any bowel movements here in the hospital yet. Denies any fevers or chills. Assessment & Plan     Acute blood loss anemia/symptomatic anemia; will order 1 unit of blood transfusion, currently not on any anticoagulation anymore, used to be on Eliquis.   Patient is not currently on hospice for gastric cancer, but planning to fly to [facility] for end-of-life care. As patient has history of terminal cancer which was fully worked up I will hold on GI consultation, patient is hemodynamically stable at this time, currently patient wanting conservative management. Discussed plan of care extensively with patient at bedside to give 1 unit PRBC, IV iron and monitor H&H. At that point will be able to determine if it is safe for patient to get on long flight to [facility] with family member. Expresses understanding with hopes that she will be able to go back home to [facility] to be with family. Palliative care consulted     Generalized weakness: Likely secondary to anemia. 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 Will treat conservatively with supporitive care. Pain and nausea meds ordered prn     Hypothyroidism: Continue on levothyroxine.     Hypertension: Hold home losartan with low-normal BP's     Depression: Continue on home dose of SSRIs. Dispo/Discharge Planning:  Pending, possible discharge within next 1-2 days. Diet:  ADULT DIET Clear Liquid  DVT PPx: SCDs  Code status: DNR    Objective:     Patient Vitals for the past 24 hrs:   Temp Pulse Resp BP SpO2   07/21/21 1552 97.8 °F (36.6 °C) 76 18 (!) 100/54 98 %   07/21/21 1244 97.5 °F (36.4 °C) 70 18 (!) 95/48 100 %   07/21/21 1140 97.7 °F (36.5 °C) 70 18 (!) 96/49 100 %   07/21/21 1107 97.6 °F (36.4 °C) 76 18 (!) 98/51 98 %   07/21/21 1040 (!) 95.9 °F (35.5 °C) 75 18 (!) 97/51 100 %   07/21/21 1018 (!) 96.1 °F (35.6 °C) 76 18 (!) 101/55 98 %   07/21/21 0750 97.6 °F (36.4 °C) 76 18 (!) 95/53 98 %   07/21/21 0345 98.5 °F (36.9 °C) 80 20 99/60 99 %   07/21/21 0001 99.5 °F (37.5 °C) 87 18 (!) 115/58 98 %     Oxygen Therapy  O2 Sat (%): 98 % (07/21/21 1552)  O2 Device: None (Room air) (07/20/21 5761)    There is no height or weight on file to calculate BMI.     Physical Exam:   General:  No acute distress, speaking in full sentences, no use of accessory muscles. Mildly pale  Lungs:  Clear to auscultation bilaterally   CV:  Regular rate and rhythm with normal S1 and S2   Abdomen:  Soft, nontender, nondistended, normoactive bowel sounds   Extremities:  No cyanosis clubbing or edema   Neuro:  Nonfocal, A&O x3   Psych:  Normal affect     Data Review:  I have reviewed all labs, meds, and studies from the last 24 hours:    Labs:    Recent Results (from the past 24 hour(s))   RBC, ALLOCATE    Collection Time: 07/21/21 12:30 AM   Result Value Ref Range    HISTORY CHECKED? Historical check performed    TYPE & SCREEN    Collection Time: 07/21/21  6:01 AM   Result Value Ref Range    Crossmatch Expiration 07/24/2021,2359     ABO/Rh(D) Danny Bustos POSITIVE     Antibody screen NEG     Unit number [id number]     Blood component type RC LR     Unit division 00     Status of unit ISSUED     Crossmatch result Compatible        All Micro Results     None          EKG Results     None          Other Studies:  CT ABD PELV W CONT    Result Date: 7/20/2021  CT OF THE ABDOMEN AND PELVIS WITH CONTRAST:          CLINICAL HISTORY:   Abdominal pain with no bowel movement for days in a patient with known gastric cancer. TECHNIQUE:  Oral and nonionic intravenous contrast was administered, and the abdomen and pelvis were scanned with spiral technique. Radiation dose reduction was achieved using one or all of the following techniques: automated exposure control, weight-based dosing, iterative reconstruction. COMPARISON:  Report of abdominopelvic CT dated May 12, 2021 from [facility] without images for direct comparison. CT ABDOMEN FINDINGS:  The lung bases are clear as imaged. A large irregular mass centered at the lesser curvature of the stomach has a central area of low attenuation suggesting necrosis.   The irregular mass is difficult to accurately measure but is approximately 10.8 cm transverse x 7.5 cm in AP x 10.0 cm craniocaudal compared with 4.2 x 5.4 x 5.7 cm reported on May 12. No vascular invasion or thrombosis is evident. The liver, spleen, pancreas, adrenals, and kidneys appear unremarkable. The transverse colon is displaced inferiorly by the gastric mass, but there is no evidence of small bowel or colonic CT PELVIS FINDINGS:   No pathologically enlarged lymph nodes or free fluid is evident. Calcified uterine fibroids are again noted. No adnexal mass is seen. Bone windows demonstrate no definite aggressive process, accounting for degenerative changes and a large sacral cyst.     1.  Approximately 11 cm irregular gastric mass centered at the lesser curvature is increased from 5.7 cm reported on Kamala CT dated May 12, 2021. 2.  Displacement of the transverse colon without evidence of invasion or obstruction. This case was reviewed in consultation with colleagues.       Current Meds:   Current Facility-Administered Medications   Medication Dose Route Frequency    sodium chloride (NS) flush 5-40 mL  5-40 mL IntraVENous Q8H    sodium chloride (NS) flush 5-40 mL  5-40 mL IntraVENous PRN    acetaminophen (TYLENOL) tablet 650 mg  650 mg Oral Q6H PRN    Or    acetaminophen (TYLENOL) suppository 650 mg  650 mg Rectal Q6H PRN    polyethylene glycol (MIRALAX) packet 17 g  17 g Oral DAILY PRN    ondansetron (ZOFRAN ODT) tablet 4 mg  4 mg Oral Q8H PRN    Or    ondansetron (ZOFRAN) injection 4 mg  4 mg IntraVENous Q6H PRN    oxyCODONE IR (ROXICODONE) tablet 10 mg  10 mg Oral Q4H PRN    levothyroxine (SYNTHROID) tablet 100 mcg  100 mcg Oral ACB    pantoprazole (PROTONIX) 40 mg in 0.9% sodium chloride 10 mL injection  40 mg IntraVENous Q12H    0.9% sodium chloride infusion 250 mL  250 mL IntraVENous PRN    0.9% sodium chloride infusion 250 mL  250 mL IntraVENous PRN       Problem List:  Hospital Problems as of 7/21/2021 Date Reviewed: 7/10/2019        Codes Class Noted - Resolved POA    Acute blood loss anemia ICD-10-CM: D62  ICD-9-CM: 285.1  7/21/2021 - Present Unknown        [facility] ([facility] ICD-10-CM: C16.9  ICD-9-CM: 151.9  7/21/2021 - Present Unknown        Symptomatic anemia ICD-10-CM: D64.9  ICD-9-CM: 285.9  7/21/2021 - Present Unknown               Part of this note was written by using a voice dictation software and the note has been proof read but may still contain some grammatical/other typographical errors.     Signed By: Cecile Ward MD   [facility] Hospitalist Service    July 21, 2021  5:15 PM

## 2021-07-21 NOTE — H&P
[facility] Hospitalist History and Physical       Name:  Nirmala Meraz  Age:85 y.o. Sex:female   :  1935    MRN:  [id number]   PCP:  Patricia Power MD      Admit Date:  2021 11:42 PM   Chief Complaint: Generalized weakness, blood in stools. Reason for Admission:   Symptomatic anemia [D64.9]  Gastric cancer ([facility] [C16.9]  Acute blood loss anemia [D62]    Assessment & Plan:     Acute blood loss anemia/symptomatic anemia; will order 1 unit of blood transfusion, currently not on any anticoagulation anymore, used to be on Eliquis. Patient is on hospice for gastric cancer, planning to fly to [facility] to spend time with family. As patient has history of terminal cancer which was fully worked up I will hold on GI consultation, patient is hemodynamically stable at this time, currently patient thinking about conservative management. Generalized weakness: Likely secondary to anemia. Terminal gastric cancer: Right chemotherapy, not very successful, currently on hospice. Will treat conservatively. Hypothyroidism: Continue on levothyroxine. Hypertension: Continue on losartan. Depression: Continue on home dose of SSRIs. Disposition/Expected LOS: 3  Diet: DIET ADULT  VTE ppx: SCD  GI ppx: Protonix  Code status: Full Code  Surrogate decision-maker: Self      History of Presenting Illness:     81years old female with history of gastric adenocarcinoma, history of chemotherapy for gastric cancer, currently decided on hospice, currently getting hospice at home presented to emergency room with generalized weakness going on for past 2 weeks duration. Still having dark-colored stools around the same amount. Patient reports she was following at [facility], was getting chemotherapy but not getting any chemotherapy anymore as patient is here for hospice.   Main reason for visit today is generalized weakness, would like to get a blood transfusion to improve her hemoglobin so she could fly back to her country to spend the rest of time there. Denies any chest pain, shortness of breath. Denies any nausea, vomiting, hematemesis. Denies any hematuria. [facility], due to lack of beds patient is transferred here. Review of Systems:  A 14 point review of systems was taken and pertinent positive as per HPI. Past medical history: History of gastric cancer. Past surgical history: History of EGD. Social history: Used to work as a , no history of alcohol abuse, history of drug abuse. No history of smoking. Family History : History of hypertension runs in family. No family history on file.      Social History     Tobacco Use    Smoking status: Never Smoker    Smokeless tobacco: Never Used   Substance Use Topics    Alcohol use: Not Currently       Allergies   Allergen Reactions    Atorvastatin Calcium Myalgia     Muscle pain    Carvedilol Other (comments)     Pain and numbness in feet    Clonazepam Myalgia     Muscle pain    Duloxetine Hcl Nausea and Vomiting     Gi upset    Ezetimibe Myalgia     Muscle cramps    Fluoxetine Hcl Other (comments)     Dry mouth    Gabapentin Other (comments)     ineffective    Olanzapine Other (comments)     Dry mouth    Pregabalin Other (comments)     confusion    Sertraline Other (comments)     Anxiety,throbbing    Simvastatin Myalgia     Muscle aches and pain    Colesevelam Other (comments)     Heartburn    Ezetimibe-Simvastatin Myalgia     Muscle cramps    Fenofibrate Rash    Olanzapine-Fluoxetine Other (comments)     Dry mouth    Penicillins Rash       Immunization History   Administered Date(s) Administered    Tdap 08/08/2016         PTA Medications:  Current Outpatient Medications   Medication Instructions    FLUoxetine (PROZAC) 20 mg capsule Oral, DAILY    levothyroxine (SYNTHROID) 100 mcg, Oral, DAILY BEFORE BREAKFAST    losartan (COZAAR) 25 mg tablet Oral, DAILY       Objective: Patient Vitals for the past 24 hrs:   Temp Pulse Resp BP SpO2   07/21/21 0001 99.5 °F (37.5 °C) 87 18 (!) 115/58 98 %       Oxygen Therapy  O2 Sat (%): 98 % (07/21/21 0001)    There is no height or weight on file to calculate BMI. Physical Exam:    General:  No acute distress, speaking in full sentences  HEENT:  Pupils equal and reactive to light and accommodation, oropharynx is clear   Neck:   Supple, no lymphadenopathy, no JVD   Lungs:  Clear to auscultation bilaterally   CV:   Regular rate and rhythm with normal S1 and S2   Abdomen:  Soft, nontender, nondistended, normoactive bowel sounds   Extremities:  No cyanosis clubbing or edema   Neuro:  Nonfocal, A&O x3   Psych:  Normal mood and affect       Data Reviewed: I have reviewed all labs, meds, and studies. Recent Results (from the past 24 hour(s))   METABOLIC PANEL, COMPREHENSIVE    Collection Time: 07/20/21  1:07 PM   Result Value Ref Range    Sodium 133 (L) 136 - 145 mmol/L    Potassium 4.5 3.5 - 5.1 mmol/L    Chloride 105 98 - 107 mmol/L    CO2 21 21 - 32 mmol/L    Anion gap 7 7 - 16 mmol/L    Glucose 120 (H) 65 - 100 mg/dL    BUN 18 8 - 23 MG/DL    Creatinine 0.62 0.6 - 1.0 MG/DL    GFR est AA >60 >60 ml/min/1.73m2    GFR est non-AA >60 >60 ml/min/1.73m2    Calcium 7.4 (L) 8.3 - 10.4 MG/DL    Bilirubin, total 0.3 0.2 - 1.1 MG/DL    ALT (SGPT) 12 12 - 65 U/L    AST (SGOT) 39 (H) 15 - 37 U/L    Alk.  phosphatase 91 50 - 136 U/L    Protein, total 5.1 (L) 6.3 - 8.2 g/dL    Albumin 1.5 (L) 3.2 - 4.6 g/dL    Globulin 3.6 (H) 2.3 - 3.5 g/dL    A-G Ratio 0.4 (L) 1.2 - 3.5     LIPASE    Collection Time: 07/20/21  1:07 PM   Result Value Ref Range    Lipase 155 73 - 393 U/L   TYPE & SCREEN    Collection Time: 07/20/21  2:43 PM   Result Value Ref Range    Crossmatch Expiration 07/23/2021,2359     ABO/Rh(D) Ed Kings POSITIVE     Antibody screen NEG    CBC WITH AUTOMATED DIFF    Collection Time: 07/20/21  2:45 PM   Result Value Ref Range    WBC 20.0 (H) 4.3 - 11.1 K/uL    RBC 2.48 (L) 4.05 - 5.2 M/uL    HGB 7.6 (L) 11.7 - 15.4 g/dL    HCT 25.8 (L) 35.8 - 46.3 %    .0 (H) 79.6 - 97.8 FL    MCH 30.6 26.1 - 32.9 PG    MCHC 29.5 (L) 31.4 - 35.0 g/dL    RDW 17.7 (H) 11.9 - 14.6 %    PLATELET 091 283 - 347 K/uL    MPV 9.2 (L) 9.4 - 12.3 FL    ABSOLUTE NRBC 0.00 0.0 - 0.2 K/uL    DF AUTOMATED      NEUTROPHILS 86 (H) 43 - 78 %    LYMPHOCYTES 5 (L) 13 - 44 %    MONOCYTES 7 4.0 - 12.0 %    EOSINOPHILS 0 (L) 0.5 - 7.8 %    BASOPHILS 0 0.0 - 2.0 %    IMMATURE GRANULOCYTES 1 0.0 - 5.0 %    ABS. NEUTROPHILS 17.2 (H) 1.7 - 8.2 K/UL    ABS. LYMPHOCYTES 1.0 0.5 - 4.6 K/UL    ABS. MONOCYTES 1.5 (H) 0.1 - 1.3 K/UL    ABS. EOSINOPHILS 0.1 0.0 - 0.8 K/UL    ABS. BASOPHILS 0.1 0.0 - 0.2 K/UL    ABS. IMM. GRANS. 0.2 0.0 - 0.5 K/UL       EKG Results     None          All Micro Results     None          Other Studies:  CT ABD PELV W CONT    Result Date: 7/20/2021  CT OF THE ABDOMEN AND PELVIS WITH CONTRAST:          CLINICAL HISTORY:   Abdominal pain with no bowel movement for days in a patient with known gastric cancer. TECHNIQUE:  Oral and nonionic intravenous contrast was administered, and the abdomen and pelvis were scanned with spiral technique. Radiation dose reduction was achieved using one or all of the following techniques: automated exposure control, weight-based dosing, iterative reconstruction. COMPARISON:  Report of abdominopelvic CT dated May 12, 2021 from [facility] without images for direct comparison. CT ABDOMEN FINDINGS:  The lung bases are clear as imaged. A large irregular mass centered at the lesser curvature of the stomach has a central area of low attenuation suggesting necrosis. The irregular mass is difficult to accurately measure but is approximately 10.8 cm transverse x 7.5 cm in AP x 10.0 cm craniocaudal compared with 4.2 x 5.4 x 5.7 cm reported on May 12. No vascular invasion or thrombosis is evident.     The liver, spleen, pancreas, adrenals, and kidneys appear unremarkable. The transverse colon is displaced inferiorly by the gastric mass, but there is no evidence of small bowel or colonic CT PELVIS FINDINGS:   No pathologically enlarged lymph nodes or free fluid is evident. Calcified uterine fibroids are again noted. No adnexal mass is seen. Bone windows demonstrate no definite aggressive process, accounting for degenerative changes and a large sacral cyst.     1.  Approximately 11 cm irregular gastric mass centered at the lesser curvature is increased from 5.7 cm reported on [place] CT dated May 12, 2021. 2.  Displacement of the transverse colon without evidence of invasion or obstruction. This case was reviewed in consultation with colleagues.         Medications:  Medications Administered              Problem List:     Hospital Problems as of 7/21/2021 Date Reviewed: 7/10/2019        Codes Class Noted - Resolved POA    Acute blood loss anemia ICD-10-CM: D62  ICD-9-CM: 285.1  7/21/2021 - Present Unknown        [facility] ([facility] ICD-10-CM: C16.9  ICD-9-CM: 151.9  7/21/2021 - Present Unknown        Symptomatic anemia ICD-10-CM: D64.9  ICD-9-CM: 285.9  7/21/2021 - Present Unknown                 Signed By: Fran Frias MD   [employer] Hospitalist Service    July 21, 2021  4:22 PM

## 2021-07-21 NOTE — PROGRESS NOTES
Pt stated that she would like to change her code status to DNR. Jana Tran NP with palliative care was notified as well as Dr Jami Ruth.

## 2021-07-21 NOTE — PROGRESS NOTES
Request from patient for Anointing of the [address] with Karlie Jack who she knows well. Bairon Vargas notified and is on his way to [facility] patient. Emmett Marc M.Div.

## 2021-07-21 NOTE — PROGRESS NOTES
Care Management Interventions  Mode of Transport at Discharge: Self  Transition of Care Consult (CM Consult): Discharge Planning  Current Support Network: Lives Alone  Confirm Follow Up Transport: Self  Discharge Location  Discharge Placement: Unable to determine at this time    Randy reviewed chart. Pt is an 81 yr old female adm yesterday due to anemia/GI Bleeed/with history of Gastric Cancer. Pt lives alone and has been managing all her affairs indep. Pt has many friends who she calls on-one is at bedside and interpreted on occasion. Pt has received IV and PO chemo in the past. Pt informed that her Oncology Md was aware of her advancing of her cancer and stated if she wanted to go to [facility] that she needed to go within one week. Pt is receiving blood and fluids and is still hoping and planning to fly to [facility] as soon as the doctors will let me. Pt will need a COVID PCR (likely) in order to fly out of the country. LADY Perez placed order. Pt has a \"cousin\" which is really just a close friend who is flying here to meet up with her and then the plan is that they would fly back together. Pt informed the chemo affected her hearing and has made her legs weak. She reiterated that she wants to be a DNR and when the [facility] is ready to take her \"home\" she is ready. Randy offered support and encouragement.  Randy will continue to follow and assist. Anuradha Lazo

## 2021-07-21 NOTE — PROGRESS NOTES
Received patient from [facility] in stable condition. Pt in bed resting quietly. Resp even & unlabored at rest; no acute signs of distress noted. Bed low & locked; call light in reach; no needs voiced.

## 2021-07-22 ENCOUNTER — HOSPICE ADMISSION (OUTPATIENT)
Dept: HOSPICE | Facility: HOSPICE | Age: 86
End: 2021-07-22
Payer: MEDICARE

## 2021-07-22 LAB
ABO + RH BLD: NORMAL
ABO + RH BLD: NORMAL
ALBUMIN SERPL-MCNC: 1.2 G/DL (ref 3.2–4.6)
ALBUMIN/GLOB SERPL: 0.4 {RATIO} (ref 1.2–3.5)
ALP SERPL-CCNC: 76 U/L (ref 50–136)
ALT SERPL-CCNC: 7 U/L (ref 12–65)
ANION GAP SERPL CALC-SCNC: 5 MMOL/L (ref 7–16)
AST SERPL-CCNC: 9 U/L (ref 15–37)
BASOPHILS # BLD: 0.1 K/UL (ref 0–0.2)
BASOPHILS NFR BLD: 1 % (ref 0–2)
BILIRUB SERPL-MCNC: 0.2 MG/DL (ref 0.2–1.1)
BLD PROD TYP BPU: NORMAL
BLOOD GROUP ANTIBODIES SERPL: NORMAL
BLOOD GROUP ANTIBODIES SERPL: NORMAL
BPU ID: NORMAL
BUN SERPL-MCNC: 14 MG/DL (ref 8–23)
CALCIUM SERPL-MCNC: 7.1 MG/DL (ref 8.3–10.4)
CHLORIDE SERPL-SCNC: 104 MMOL/L (ref 98–107)
CO2 SERPL-SCNC: 25 MMOL/L (ref 21–32)
CREAT SERPL-MCNC: 0.6 MG/DL (ref 0.6–1)
CROSSMATCH RESULT,%XM: NORMAL
DIFFERENTIAL METHOD BLD: ABNORMAL
EOSINOPHIL # BLD: 0.3 K/UL (ref 0–0.8)
EOSINOPHIL NFR BLD: 3 % (ref 0.5–7.8)
ERYTHROCYTE [DISTWIDTH] IN BLOOD BY AUTOMATED COUNT: 19 % (ref 11.9–14.6)
GLOBULIN SER CALC-MCNC: 2.8 G/DL (ref 2.3–3.5)
GLUCOSE SERPL-MCNC: 95 MG/DL (ref 65–100)
HCT VFR BLD AUTO: 23.2 % (ref 35.8–46.3)
HGB BLD-MCNC: 7.3 G/DL (ref 11.7–15.4)
IMM GRANULOCYTES # BLD AUTO: 0.1 K/UL (ref 0–0.5)
IMM GRANULOCYTES NFR BLD AUTO: 1 % (ref 0–5)
LYMPHOCYTES # BLD: 0.8 K/UL (ref 0.5–4.6)
LYMPHOCYTES NFR BLD: 7 % (ref 13–44)
MAGNESIUM SERPL-MCNC: 2 MG/DL (ref 1.8–2.4)
MCH RBC QN AUTO: 30.3 PG (ref 26.1–32.9)
MCHC RBC AUTO-ENTMCNC: 31.5 G/DL (ref 31.4–35)
MCV RBC AUTO: 96.3 FL (ref 79.6–97.8)
MONOCYTES # BLD: 1.4 K/UL (ref 0.1–1.3)
MONOCYTES NFR BLD: 11 % (ref 4–12)
NEUTS SEG # BLD: 9.9 K/UL (ref 1.7–8.2)
NEUTS SEG NFR BLD: 78 % (ref 43–78)
NRBC # BLD: 0 K/UL (ref 0–0.2)
PLATELET # BLD AUTO: 276 K/UL (ref 150–450)
PMV BLD AUTO: 9.2 FL (ref 9.4–12.3)
POTASSIUM SERPL-SCNC: 3.5 MMOL/L (ref 3.5–5.1)
PROT SERPL-MCNC: 4 G/DL (ref 6.3–8.2)
RBC # BLD AUTO: 2.41 M/UL (ref 4.05–5.2)
SODIUM SERPL-SCNC: 134 MMOL/L (ref 136–145)
SPECIMEN EXP DATE BLD: NORMAL
SPECIMEN EXP DATE BLD: NORMAL
STATUS OF UNIT,%ST: NORMAL
UNIT DIVISION, %UDIV: 0
WBC # BLD AUTO: 12.6 K/UL (ref 4.3–11.1)

## 2021-07-22 PROCEDURE — 80053 COMPREHEN METABOLIC PANEL: CPT

## 2021-07-22 PROCEDURE — 85025 COMPLETE CBC W/AUTO DIFF WBC: CPT

## 2021-07-22 PROCEDURE — 99218 HC RM OBSERVATION: CPT

## 2021-07-22 PROCEDURE — 2709999900 HC NON-CHARGEABLE SUPPLY

## 2021-07-22 PROCEDURE — 36415 COLL VENOUS BLD VENIPUNCTURE: CPT

## 2021-07-22 PROCEDURE — 74011250637 HC RX REV CODE- 250/637: Performed by: STUDENT IN AN ORGANIZED HEALTH CARE EDUCATION/TRAINING PROGRAM

## 2021-07-22 PROCEDURE — 83735 ASSAY OF MAGNESIUM: CPT

## 2021-07-22 PROCEDURE — 74011250637 HC RX REV CODE- 250/637: Performed by: HOSPITALIST

## 2021-07-22 RX ORDER — PANTOPRAZOLE SODIUM 40 MG/1
40 TABLET, DELAYED RELEASE ORAL
Status: DISCONTINUED | OUTPATIENT
Start: 2021-07-22 | End: 2021-07-23 | Stop reason: HOSPADM

## 2021-07-22 RX ADMIN — FLUOXETINE 20 MG: 10 CAPSULE ORAL at 08:43

## 2021-07-22 RX ADMIN — PANTOPRAZOLE SODIUM 40 MG: 40 TABLET, DELAYED RELEASE ORAL at 05:27

## 2021-07-22 RX ADMIN — Medication 10 ML: at 15:04

## 2021-07-22 RX ADMIN — Medication 10 ML: at 21:33

## 2021-07-22 RX ADMIN — PANTOPRAZOLE SODIUM 40 MG: 40 TABLET, DELAYED RELEASE ORAL at 17:29

## 2021-07-22 RX ADMIN — ONDANSETRON 4 MG: 4 TABLET, ORALLY DISINTEGRATING ORAL at 05:09

## 2021-07-22 RX ADMIN — LEVOTHYROXINE SODIUM 100 MCG: 0.1 TABLET ORAL at 05:27

## 2021-07-22 NOTE — PROGRESS NOTES
Sw called to pt's room first this thing am. Pt now has two friends who are at bedside. They both explain to Sw that the plans to fly to [facility] are NOT going to happen. The \"cousin/friend\" is not coming and supposedly [facility] is not allowing anyone \"sick\" to enter. Sw informed a COVID test was in progress, but they said she would not qualify to enter. Friends (as well as Sigrid Paget who visited her yesterday) informed her about the [facility]. Pt just keeps repeating I only go to [address] [place] [address]. Sw informed a referral could be placed for her to be evaluated. If/when she is denied we need to have a back up plan. Informed the other two options are home with hiring caregivers and having [address] come to her OR going to a NH paying daily room/board ($250 a day) and having [address] (or other agency care for her there). Ref placed to Hospice liaisons. Await medical director decision and evaluation from liaisons. Bradley Bain basically spent all day working with pt, her two friends Michelle Hu [phone] and Maria Teresa Ita [phone]), hospice liaison Siena Franklin, and NH adm yosef Camilo. Pt was evaluated for [facility]. She does not yet meet criteria for GIP, but has been approved for Domiciliary. Pt will private pay at [facility] until she meets criteria. Pt approved and has a bed for Friday. Jayna arranged transport for 10am. Md informed and plans to do d/c early. Pt and friends are VERY appreciative and thankful for all Sw time, effor,t and compassion. Need DNR signed for transport.    Nia Reinoso

## 2021-07-22 NOTE — PROGRESS NOTES
Hospitalist Progress Note     Name:  Eddie Pierre  Age:85 y.o. Sex:female   :  1935    MRN:  [id number]   Admit Date:  2021    Presenting Complaint: No chief complaint on file. Initial Admission Diagnosis: Symptomatic anemia [D64.9]  Gastric cancer (HCC) [C16.9]  Acute blood loss anemia [D62]     Hospital Course/Interval history:     81years old female with history of gastric adenocarcinoma, history of chemotherapy for gastric cancer, currently decided on hospice, currently getting hospice at home presented to emergency room with generalized weakness going on for past 2 weeks duration. Still having dark-colored stools around the same amount. Patient reports she was following at [facility], was getting chemotherapy but not getting any chemotherapy anymore as patient is here for hospice. Main reason for visit today is generalized weakness, would like to get a blood transfusion to improve her hemoglobin so she could fly back to her country to spend the rest of time there. Denies any chest pain, shortness of breath. Denies any nausea, vomiting, hematemesis. Denies any hematuria. Patient was initially evaluated at [facility] emergency room, due to lack of beds patient is transferred here. Patient was evaluated by hospice today and accepted to [address] [place] [address] tomorrow. Subjective (21): Patient is pleasant, alert awake oriented x3. She appears comfortable. Tolerating liquid diet but patient has not been able to take much p.o. No nausea no vomiting. No chest pain no shortness of breath. Patient was evaluated by hospice and plan to discharge to medical hospice house tomorrow a.m. Assessment & Plan     Acute blood loss anemia/symptomatic anemia; will order 1 unit of blood transfusion, currently not on any anticoagulation anymore, used to be on Eliquis. Patient is not currently on hospice for gastric cancer, but planning to fly to [facility] for end-of-life care.   As patient has history of terminal cancer which was fully worked up I will hold on GI consultation, patient is hemodynamically stable at this time, currently patient wanting conservative management. Discussed plan of care extensively with patient at bedside to give 1 unit PRBC, IV iron and monitor H&H. At that point will be able to determine if it is safe for patient to get on long flight to [facility] with family member. Expresses understanding with hopes that she will be able to go back home to [facility] to be with family. 7/22 hospice consulted. Plan to discharge patient to hospice house tomorrow.     Generalized weakness: Likely secondary to anemia. 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 Will treat conservatively with supporitive care. Pain and nausea meds ordered prn     Hypothyroidism: Continue on levothyroxine.     Hypertension: Hold home losartan with low-normal BP's     Depression: Continue on home dose of SSRIs. Dispo/Discharge Planning:  Planning to discharge patient to medical hospice house tomorrow a.m. Diet:  ADULT DIET Clear Liquid  DVT PPx: SCDs  Code status: DNR    Objective:     Patient Vitals for the past 24 hrs:   Temp Pulse Resp BP SpO2   07/22/21 1639 98.1 °F (36.7 °C) 82 18 125/68 100 %   07/22/21 1138 98 °F (36.7 °C) 79 16 105/62 100 %   07/22/21 0753 97.5 °F (36.4 °C) 78 16 111/65 97 %   07/22/21 0419 97.8 °F (36.6 °C) 76 18 (!) 91/52 98 %   07/21/21 2344 97.9 °F (36.6 °C) 72 18 102/64 97 %   07/21/21 2036 97.9 °F (36.6 °C) 72 16 104/65 97 %     Oxygen Therapy  O2 Sat (%): 100 % (07/22/21 1639)  O2 Device: None (Room air) (07/22/21 0839)    There is no height or weight on file to calculate BMI. Physical Exam:   General:  Elderly female, chronically ill-appearing, no acute distress, speaking in full sentences, no use of accessory muscles.  Mildly pale  Lungs:  Clear to auscultation bilaterally   CV:  Regular rate and rhythm with normal S1 and S2 Abdomen:  Soft, nontender, nondistended, normoactive bowel sounds, no organomegaly,  Extremities:  No cyanosis clubbing or edema   Neuro:  Nonfocal, A&O x3   Psych:  Normal affect     Data Review:  I have reviewed all labs, meds, and studies from the last 24 hours:    Labs:    Recent Results (from the past 24 hour(s))   METABOLIC PANEL, COMPREHENSIVE    Collection Time: 07/22/21  6:08 AM   Result Value Ref Range    Sodium 134 (L) 136 - 145 mmol/L    Potassium 3.5 3.5 - 5.1 mmol/L    Chloride 104 98 - 107 mmol/L    CO2 25 21 - 32 mmol/L    Anion gap 5 (L) 7 - 16 mmol/L    Glucose 95 65 - 100 mg/dL    BUN 14 8 - 23 MG/DL    Creatinine 0.60 0.6 - 1.0 MG/DL    GFR est AA >60 >60 ml/min/1.73m2    GFR est non-AA >60 >60 ml/min/1.73m2    Calcium 7.1 (L) 8.3 - 10.4 MG/DL    Bilirubin, total 0.2 0.2 - 1.1 MG/DL    ALT (SGPT) 7 (L) 12 - 65 U/L    AST (SGOT) 9 (L) 15 - 37 U/L    Alk. phosphatase 76 50 - 136 U/L    Protein, total 4.0 (L) 6.3 - 8.2 g/dL    Albumin 1.2 (L) 3.2 - 4.6 g/dL    Globulin 2.8 2.3 - 3.5 g/dL    A-G Ratio 0.4 (L) 1.2 - 3.5     MAGNESIUM    Collection Time: 07/22/21  6:08 AM   Result Value Ref Range    Magnesium 2.0 1.8 - 2.4 mg/dL   CBC WITH AUTOMATED DIFF    Collection Time: 07/22/21  6:08 AM   Result Value Ref Range    WBC 12.6 (H) 4.3 - 11.1 K/uL    RBC 2.41 (L) 4.05 - 5.2 M/uL    HGB 7.3 (L) 11.7 - 15.4 g/dL    HCT 23.2 (L) 35.8 - 46.3 %    MCV 96.3 79.6 - 97.8 FL    MCH 30.3 26.1 - 32.9 PG    MCHC 31.5 31.4 - 35.0 g/dL    RDW 19.0 (H) 11.9 - 14.6 %    PLATELET 232 752 - 746 K/uL    MPV 9.2 (L) 9.4 - 12.3 FL    ABSOLUTE NRBC 0.00 0.0 - 0.2 K/uL    DF AUTOMATED      NEUTROPHILS 78 43 - 78 %    LYMPHOCYTES 7 (L) 13 - 44 %    MONOCYTES 11 4.0 - 12.0 %    EOSINOPHILS 3 0.5 - 7.8 %    BASOPHILS 1 0.0 - 2.0 %    IMMATURE GRANULOCYTES 1 0.0 - 5.0 %    ABS. NEUTROPHILS 9.9 (H) 1.7 - 8.2 K/UL    ABS. LYMPHOCYTES 0.8 0.5 - 4.6 K/UL    ABS. MONOCYTES 1.4 (H) 0.1 - 1.3 K/UL    ABS.  EOSINOPHILS 0.3 0.0 - 0.8 K/UL    ABS. BASOPHILS 0.1 0.0 - 0.2 K/UL    ABS. IMM. GRANS. 0.1 0.0 - 0.5 K/UL       All Micro Results     None          EKG Results     None          Other Studies:  No results found. Current Meds:   Current Facility-Administered Medications   Medication Dose Route Frequency    pantoprazole (PROTONIX) tablet 40 mg  40 mg Oral ACB&D    sodium chloride (NS) flush 5-40 mL  5-40 mL IntraVENous Q8H    sodium chloride (NS) flush 5-40 mL  5-40 mL IntraVENous PRN    acetaminophen (TYLENOL) tablet 650 mg  650 mg Oral Q6H PRN    Or    acetaminophen (TYLENOL) suppository 650 mg  650 mg Rectal Q6H PRN    polyethylene glycol (MIRALAX) packet 17 g  17 g Oral DAILY PRN    ondansetron (ZOFRAN ODT) tablet 4 mg  4 mg Oral Q8H PRN    Or    ondansetron (ZOFRAN) injection 4 mg  4 mg IntraVENous Q6H PRN    oxyCODONE IR (ROXICODONE) tablet 10 mg  10 mg Oral Q4H PRN    levothyroxine (SYNTHROID) tablet 100 mcg  100 mcg Oral ACB    0.9% sodium chloride infusion 250 mL  250 mL IntraVENous PRN    0.9% sodium chloride infusion 250 mL  250 mL IntraVENous PRN    FLUoxetine (PROzac) capsule 20 mg  20 mg Oral DAILY       Problem List:  Hospital Problems as of 7/22/2021 Date Reviewed: 7/10/2019        Codes Class Noted - Resolved POA    Acute blood loss anemia ICD-10-CM: D62  ICD-9-CM: 285.1  7/21/2021 - Present Unknown        Gastric cancer (HCC) ICD-10-CM: C16.9  ICD-9-CM: 151.9  7/21/2021 - Present Unknown        * (Principal) Symptomatic anemia ICD-10-CM: D64.9  ICD-9-CM: 285.9  7/21/2021 - Present Unknown               Part of this note was written by using a voice dictation software and the note has been proof read but may still contain some grammatical/other typographical errors.     Signed By: Jana Barboza MD   [facility] Hospitalist Service    July 22, 2021

## 2021-07-22 NOTE — HOSPICE
[facility]-    I met with pt and a good friend of hers, Cortney Christy [phone] and we discussed hospice services and support. 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 We discussed hospice levels of care per medicare regulations. We discussed she does not meet the general inpatient criteria at the [facility] but that Dr. Sophia Montez and hospice administration has accepted pt for Domiciliary Care at the [facility] and pt will need to pay room and board of $256/day until she meets general inpatient level. Pt was happy with that arrangement pending bed availability. The plan-  1. Bed became available and pt will be transported at 1030 in the am 7/23 via [facility]. 2.  Rula AGUIRRE has made Dr. Shelby Chi aware. 3.  I will sign DNR and hospice consents with pt at 1000. 4.  Please leave in IV and cordova IF present.     Thank you for this referral-    Lucina Crawford RN BSN  Hospice Liaison  [phone]

## 2021-07-22 NOTE — HOSPICE
[facility]-    I have reached out the the hospice provider to discuss the case and I am waiting to hear back whether pt meets general inpatient criteria or whether she meets routine level. A [facility] RN was in the pt's home on 7/18 to discuss hospice services but the pt did not want hospice at that time and was very hopeful to go to [facility] for her final days. It seems like everyone but the pt understands that that is impossible now with [facility]'s travel restrictions.     Thank you for this referral-    Keesha Bolton RN BSN  Hospice Liaison  [phone]

## 2021-07-22 NOTE — PROGRESS NOTES
Problem: Falls - Risk of  Goal: *Absence of Falls  Description: Document Marguerite Capps Fall Risk and appropriate interventions in the flowsheet. 7/22/2021 1017 by Annette Pope RN  Outcome: Progressing Towards Goal  Note: Fall Risk Interventions:  Mobility Interventions: Patient to call before getting OOB         Medication Interventions: Bed/chair exit alarm, Patient to call before getting OOB    Elimination Interventions: Bed/chair exit alarm, Call light in reach           7/22/2021 1017 by Annette Pope RN  Outcome: Progressing Towards Goal  Note: Fall Risk Interventions:  Mobility Interventions: Patient to call before getting OOB         Medication Interventions: Bed/chair exit alarm, Patient to call before getting OOB    Elimination Interventions: Bed/chair exit alarm, Call light in reach              Problem: Pressure Injury - Risk of  Goal: *Prevention of pressure injury  Description: Document Ramesh Scale and appropriate interventions in the flowsheet.   7/22/2021 1017 by Annette Pope RN  Outcome: Progressing Towards Goal  Note: Pressure Injury Interventions:  Sensory Interventions: Assess changes in LOC    Moisture Interventions: Absorbent underpads, Limit adult briefs    Activity Interventions: PT/OT evaluation    Mobility Interventions: HOB 30 degrees or less, Pressure redistribution bed/mattress (bed type), PT/OT evaluation    Nutrition Interventions: Document food/fluid/supplement intake                  7/22/2021 1017 by Annette Pope RN  Outcome: Progressing Towards Goal  Note: Pressure Injury Interventions:  Sensory Interventions: Assess changes in LOC    Moisture Interventions: Absorbent underpads, Limit adult briefs    Activity Interventions: PT/OT evaluation    Mobility Interventions: HOB 30 degrees or less, Pressure redistribution bed/mattress (bed type), PT/OT evaluation    Nutrition Interventions: Document food/fluid/supplement intake                     Problem: Anemia Care Plan (Adult and Pediatric)  Goal: *Labs within defined limits  Outcome: Progressing Towards Goal  Goal: *Tolerates increased activity  Outcome: Progressing Towards Goal

## 2021-07-22 NOTE — PROGRESS NOTES
No bm or emesis this shift. Roxicodone 10 mg given PO for c/o abd pain at 2344. Hourly rounds completed. Report given to oncoming nurse.

## 2021-07-23 ENCOUNTER — HOSPITAL ENCOUNTER (INPATIENT)
Age: 86
LOS: 9 days | Discharge: HOME | End: 2021-08-01
Attending: INTERNAL MEDICINE | Admitting: INTERNAL MEDICINE

## 2021-07-23 VITALS
SYSTOLIC BLOOD PRESSURE: 95 MMHG | HEART RATE: 76 BPM | TEMPERATURE: 97.3 F | DIASTOLIC BLOOD PRESSURE: 55 MMHG | RESPIRATION RATE: 18 BRPM | OXYGEN SATURATION: 97 %

## 2021-07-23 DIAGNOSIS — K44.9 GASTROESOPHAGEAL REFLUX DISEASE WITH HIATAL HERNIA: ICD-10-CM

## 2021-07-23 DIAGNOSIS — E03.2 HYPOTHYROIDISM DUE TO NON-MEDICATION EXOGENOUS SUBSTANCES: ICD-10-CM

## 2021-07-23 DIAGNOSIS — F41.1 GENERALIZED ANXIETY DISORDER: ICD-10-CM

## 2021-07-23 DIAGNOSIS — H91.23 SUDDEN IDIOPATHIC HEARING LOSS OF BOTH EARS: ICD-10-CM

## 2021-07-23 DIAGNOSIS — D62 ACUTE BLOOD LOSS ANEMIA: ICD-10-CM

## 2021-07-23 DIAGNOSIS — I82.629 DEEP VEIN THROMBOSIS (DVT) OF OTHER VEIN OF UPPER EXTREMITY, UNSPECIFIED CHRONICITY, UNSPECIFIED LATERALITY (HCC): ICD-10-CM

## 2021-07-23 DIAGNOSIS — C16.9 GASTRIC ADENOCARCINOMA (HCC): ICD-10-CM

## 2021-07-23 DIAGNOSIS — R42 DISEQUILIBRIUM: ICD-10-CM

## 2021-07-23 DIAGNOSIS — F31.75 BIPOLAR DISORDER, IN PARTIAL REMISSION, MOST RECENT EPISODE DEPRESSED (HCC): ICD-10-CM

## 2021-07-23 DIAGNOSIS — C16.8 MALIGNANT NEOPLASM OF OVERLAPPING SITES OF STOMACH (HCC): Primary | ICD-10-CM

## 2021-07-23 DIAGNOSIS — K21.9 GASTROESOPHAGEAL REFLUX DISEASE WITH HIATAL HERNIA: ICD-10-CM

## 2021-07-23 DIAGNOSIS — J43.1 PANLOBULAR EMPHYSEMA (HCC): ICD-10-CM

## 2021-07-23 DIAGNOSIS — K29.01 GASTROINTESTINAL HEMORRHAGE ASSOCIATED WITH ACUTE GASTRITIS: ICD-10-CM

## 2021-07-23 DIAGNOSIS — R41.3 MEMORY LOSS: ICD-10-CM

## 2021-07-23 DIAGNOSIS — I35.1 NONRHEUMATIC AORTIC VALVE INSUFFICIENCY: ICD-10-CM

## 2021-07-23 PROBLEM — E87.6 HYPOKALEMIA: Status: ACTIVE | Noted: 2021-03-10

## 2021-07-23 PROBLEM — G62.9 PERIPHERAL NEUROPATHY: Status: RESOLVED | Noted: 2021-07-23 | Resolved: 2021-07-23

## 2021-07-23 PROBLEM — M85.80 OSTEOPENIA: Status: ACTIVE | Noted: 2019-05-01

## 2021-07-23 PROBLEM — M81.0 AGE-RELATED OSTEOPOROSIS WITHOUT CURRENT PATHOLOGICAL FRACTURE: Status: ACTIVE | Noted: 2021-07-23

## 2021-07-23 PROBLEM — Z85.028 ENCOUNTER FOR FOLLOW-UP SURVEILLANCE OF GASTRIC CANCER: Status: ACTIVE | Noted: 2021-07-23

## 2021-07-23 PROBLEM — F31.70 BIPOLAR DISORDER IN PARTIAL REMISSION (HCC): Status: ACTIVE | Noted: 2021-07-23

## 2021-07-23 PROBLEM — M25.561 RIGHT KNEE PAIN: Status: ACTIVE | Noted: 2019-09-27

## 2021-07-23 PROBLEM — K92.2 GASTROINTESTINAL HEMORRHAGE: Status: ACTIVE | Noted: 2020-12-17

## 2021-07-23 PROBLEM — M81.0 AGE-RELATED OSTEOPOROSIS WITHOUT CURRENT PATHOLOGICAL FRACTURE: Status: RESOLVED | Noted: 2021-07-23 | Resolved: 2021-07-23

## 2021-07-23 PROBLEM — M85.80 OSTEOPENIA: Status: RESOLVED | Noted: 2019-05-01 | Resolved: 2021-07-23

## 2021-07-23 PROBLEM — M25.561 RIGHT KNEE PAIN: Status: RESOLVED | Noted: 2019-09-27 | Resolved: 2021-07-23

## 2021-07-23 PROBLEM — Z08 ENCOUNTER FOR FOLLOW-UP SURVEILLANCE OF GASTRIC CANCER: Status: ACTIVE | Noted: 2021-07-23

## 2021-07-23 PROBLEM — G62.9 PERIPHERAL NEUROPATHY: Status: ACTIVE | Noted: 2021-07-23

## 2021-07-23 PROBLEM — I82.409 DVT (DEEP VENOUS THROMBOSIS) (HCC): Status: ACTIVE | Noted: 2021-02-17

## 2021-07-23 PROBLEM — E53.8 B12 DEFICIENCY: Status: ACTIVE | Noted: 2018-05-03

## 2021-07-23 PROBLEM — E16.2 HYPOGLYCEMIA: Status: ACTIVE | Noted: 2020-10-06

## 2021-07-23 PROCEDURE — 99218 HC RM OBSERVATION: CPT

## 2021-07-23 PROCEDURE — 74011250637 HC RX REV CODE- 250/637: Performed by: INTERNAL MEDICINE

## 2021-07-23 PROCEDURE — 0651 HSPC ROUTINE HOME CARE

## 2021-07-23 PROCEDURE — 74011250637 HC RX REV CODE- 250/637: Performed by: HOSPITALIST

## 2021-07-23 PROCEDURE — G0299 HHS/HOSPICE OF RN EA 15 MIN: HCPCS

## 2021-07-23 PROCEDURE — 74011250636 HC RX REV CODE- 250/636: Performed by: INTERNAL MEDICINE

## 2021-07-23 PROCEDURE — 74011250637 HC RX REV CODE- 250/637: Performed by: STUDENT IN AN ORGANIZED HEALTH CARE EDUCATION/TRAINING PROGRAM

## 2021-07-23 PROCEDURE — 3336500001 HSPC ELECTION

## 2021-07-23 RX ORDER — LORAZEPAM 1 MG/1
1 TABLET ORAL
Status: DISCONTINUED | OUTPATIENT
Start: 2021-07-23 | End: 2021-08-01 | Stop reason: HOSPADM

## 2021-07-23 RX ORDER — PANTOPRAZOLE SODIUM 40 MG/1
40 TABLET, DELAYED RELEASE ORAL
Status: DISCONTINUED | OUTPATIENT
Start: 2021-07-24 | End: 2021-08-01 | Stop reason: HOSPADM

## 2021-07-23 RX ORDER — MORPHINE SULFATE 100 MG/5ML
10 SOLUTION ORAL
Status: DISCONTINUED | OUTPATIENT
Start: 2021-07-23 | End: 2021-08-01 | Stop reason: HOSPADM

## 2021-07-23 RX ORDER — ESCITALOPRAM OXALATE 10 MG/1
1 TABLET ORAL DAILY
COMMUNITY
End: 2021-08-01

## 2021-07-23 RX ORDER — POLYETHYLENE GLYCOL 3350 17 G/17G
17 POWDER, FOR SOLUTION ORAL
COMMUNITY
End: 2021-08-01

## 2021-07-23 RX ORDER — OLANZAPINE 2.5 MG/1
5 TABLET ORAL EVERY EVENING
Status: DISCONTINUED | OUTPATIENT
Start: 2021-07-23 | End: 2021-07-25

## 2021-07-23 RX ORDER — ACETAMINOPHEN 325 MG/1
650 TABLET ORAL
Status: DISCONTINUED | OUTPATIENT
Start: 2021-07-23 | End: 2021-08-01 | Stop reason: HOSPADM

## 2021-07-23 RX ORDER — AMLODIPINE BESYLATE 5 MG/1
1 TABLET ORAL DAILY
COMMUNITY
End: 2021-08-01

## 2021-07-23 RX ORDER — LOSARTAN POTASSIUM 50 MG/1
50 TABLET ORAL DAILY
COMMUNITY
Start: 2020-12-22 | End: 2021-08-01

## 2021-07-23 RX ORDER — POLYETHYLENE GLYCOL 3350 17 G/17G
17 POWDER, FOR SOLUTION ORAL DAILY
Status: DISCONTINUED | OUTPATIENT
Start: 2021-07-24 | End: 2021-07-25

## 2021-07-23 RX ORDER — HALOPERIDOL 5 MG/ML
2 INJECTION INTRAMUSCULAR
Status: DISCONTINUED | OUTPATIENT
Start: 2021-07-23 | End: 2021-08-01 | Stop reason: HOSPADM

## 2021-07-23 RX ORDER — LEVOTHYROXINE SODIUM 125 UG/1
125 TABLET ORAL DAILY
Status: ON HOLD | COMMUNITY
Start: 2021-04-08 | End: 2021-07-30 | Stop reason: SDUPTHER

## 2021-07-23 RX ORDER — HALOPERIDOL 1 MG/1
2 TABLET ORAL
Status: DISCONTINUED | OUTPATIENT
Start: 2021-07-23 | End: 2021-08-01 | Stop reason: HOSPADM

## 2021-07-23 RX ORDER — CITALOPRAM 20 MG/1
20 TABLET, FILM COATED ORAL DAILY
Status: DISCONTINUED | OUTPATIENT
Start: 2021-07-24 | End: 2021-08-01 | Stop reason: HOSPADM

## 2021-07-23 RX ORDER — LEVOTHYROXINE SODIUM 50 UG/1
125 TABLET ORAL DAILY
Status: DISCONTINUED | OUTPATIENT
Start: 2021-07-24 | End: 2021-07-25

## 2021-07-23 RX ORDER — HYOSCYAMINE SULFATE 0.12 MG/1
0.12 TABLET SUBLINGUAL
Status: DISCONTINUED | OUTPATIENT
Start: 2021-07-23 | End: 2021-08-01 | Stop reason: HOSPADM

## 2021-07-23 RX ADMIN — LEVOTHYROXINE SODIUM 100 MCG: 0.1 TABLET ORAL at 05:51

## 2021-07-23 RX ADMIN — HALOPERIDOL LACTATE 2 MG: 5 INJECTION, SOLUTION INTRAMUSCULAR at 18:50

## 2021-07-23 RX ADMIN — OXYCODONE 10 MG: 5 TABLET ORAL at 00:07

## 2021-07-23 RX ADMIN — PANTOPRAZOLE SODIUM 40 MG: 40 TABLET, DELAYED RELEASE ORAL at 05:51

## 2021-07-23 RX ADMIN — Medication 10 ML: at 05:51

## 2021-07-23 RX ADMIN — OLANZAPINE 5 MG: 2.5 TABLET, FILM COATED ORAL at 19:11

## 2021-07-23 NOTE — PROGRESS NOTES
Problem: Hospice Orientation  Goal: Demonstrate understanding of hospice philosophy, plan of care, and home hospice program  Description: The patient/family/caregiver will demonstrate understanding of hospice philosophy, plan of care and the home hospice program as evidenced by participation in meeting the patient's psychosocial, spiritual, medical, and physical needs inclusive of medical supplies/equipment focusing on symptoms.   Outcome: Progressing Towards Goal

## 2021-07-23 NOTE — DISCHARGE SUMMARY
[facility] Hospitalist Discharge Summary     Name:  Rubens Coates    Age:85 y.o. Sex:female  :  1935       MRN:  [id number]       Admitting Physician: Devaughn Nevarez MD Admit Date: 2021 11:42 PM   Attending Physician: Fernando Garcia MD  Primary Care Physician: Chioma Webb MD       Discharge Physician: Lucho Grijalva MD  Discharge date: 21   Discharged Condition: Stable    Indication for Admission:   No chief complaint on file. Reasons for hospitalization:  Hospital Problems as of 2021 Date Reviewed: 7/10/2019        Codes Class Noted - Resolved POA    Acute blood loss anemia ICD-10-CM: D62  ICD-9-CM: 285.1  2021 - Present Unknown        [facility] ([facility] ICD-10-CM: C16.9  ICD-9-CM: 151.9  2021 - Present Unknown        * (Principal) Symptomatic anemia ICD-10-CM: D64.9  ICD-9-CM: 285.9  2021 - Present Unknown                 Discharge Diagnosis:     Acute blood loss anemia/symptomatic anemia POA from terminal gastric cancer POA  Terminal gastric cancer. Generalized weakness  Hypothyroidism  Hypertension  Depression    Did Patient have Sepsis (YES OR NO): NO      Hospital Course/Interval history:    Patient is a 50-year-old female with past medical history significant for gastric adenocarcinoma, history of chemotherapy for gastric cancer currently on hospice getting home hospice services presented to the ER with generalized weakness for the last 2 weeks. She also had dark-colored stools. She has been following at [facility] for her gastric adenocarcinoma. Currently not on any chemotherapy. During the course of hospitalization patient had symptomatic anemia secondary to acute blood loss anemia from terminal gastric cancer. Previously she was on Eliquis but not on anticoagulation anymore. She received blood transfusions during the hospitalization. Patient initially wanted to fly to [facility] for end-of-life care.   Due to her terminal condition, pt and family decided to go to Hospice house after evaluation by hospice. Patient is being discharged to hospice is today in a stable condition. Consults:   IP CONSULT TO PALLIATIVE CARE - PROVIDER     Disposition: Hospice/Medical Facility  Diet:   DIET ADULT  Code Status: DNR      Follow up labs/imaging: None   Follow up with physician at [address] [place] [address]  in 48-72 hours  Pending labs/studies: None    Current Discharge Medication List      STOP taking these medications       furosemide (Lasix) 20 mg tablet Comments:   Reason for Stopping:         levothyroxine (SYNTHROID) 100 mcg tablet Comments:   Reason for Stopping:         losartan (COZAAR) 25 mg tablet Comments:   Reason for Stopping:         FLUoxetine (PROZAC) 20 mg capsule Comments:   Reason for Stopping:               Medications Discontinued During This Encounter   Medication Reason    pantoprazole (PROTONIX) 80 mg in 0.9% sodium chloride 100 mL infusion Formulary Change    FLUoxetine (PROzac) capsule 10 mg Not A Current Medication    losartan (COZAAR) tablet 25 mg Not A Current Medication    0.9% sodium chloride infusion 250 mL     pantoprazole (PROTONIX) 40 mg in 0.9% sodium chloride 10 mL injection          Follow Up Orders: Follow-up Appointments   Procedures    FOLLOW UP VISIT Appointment in: 3 - 5 Days F/U with physician at [facility] in 48 hours. F/U with physician at [facility] in 48 hours.      Standing Status:   Standing     Number of Occurrences:   1     Order Specific Question:   Appointment in     Answer:   3 - 5 Days         Follow-up Information     Follow up With Specialties Details Why Contact Info    Sandrita Savage MD Internal Medicine   [address] [phone]      Sherri Angel MD Geriatric Medicine   [address] [facility] [address] [phone]              Discharge Exam:    Patient Vitals for the past 24 hrs:   Temp Pulse Resp BP SpO2   07/23/21 0730 97.3 °F (36.3 °C) 76 18 (!) 95/55 97 %   07/23/21 0441 97.5 °F (36.4 °C) 76 18 (!) 90/53 96 %   07/23/21 0047 98.2 °F (36.8 °C) 78 18 105/63 97 %   07/22/21 2027 98.8 °F (37.1 °C) 79 18 107/62 97 %   07/22/21 1639 98.1 °F (36.7 °C) 82 18 125/68 100 %   07/22/21 1138 98 °F (36.7 °C) 79 16 105/62 100 %     Oxygen Therapy  O2 Sat (%): 97 % (07/23/21 0730)  O2 Device: None (Room air) (07/22/21 1930)    Estimated body mass index is 24.62 kg/m² as calculated from the following:    Height as of 5/30/19: 5' 3\" (1.6 m). Weight as of 6/11/19: 63 kg (139 lb). No intake or output data in the 24 hours ending 07/23/21 8606    *Note that automatically entered I/Os may not be accurate; dependent on patient compliance with collection and accurate  by assistants. Physical Exam:   General:  Chronically ill appearing, elderly female, No overt distress, speaking in full sentences, no use of accessory muscles   HEENT:  Pupils equal and reactive to light and accommodation, oropharynx is clear   Neck:   Supple, no lymphadenopathy, no JVD   Lungs:  Clear to auscultation bilaterally   CV:  Regular rate and rhythm with normal S1 and S2   Abdomen: Soft, nontender, nondistended, normoactive bowel sounds   Extremities:  No cyanosis clubbing or edema   Neuro:  Nonfocal, A&O x3   Psych:  Normal affect       All Labs from Last 24 Hrs:  No results found for this or any previous visit (from the past 24 hour(s)). All Micro Results     None          SARS-CoV-2 Lab Results  \"Novel Coronavirus\" Test: No results found for: COV2NT   \"Emergent Disease\" Test: No results found for: EDPR  \"SARS-COV-2\" Test: No results found for: XGCOVT  Rapid Test:   No results found for: COVR         Diagnostic Imaging/Tests:   CT ABD PELV W CONT    Result Date: 7/20/2021  1.   Approximately 11 cm irregular gastric mass centered at the lesser curvature is increased from 5.7 cm reported on [place] CT dated May 12, 2021. 2.  Displacement of the transverse colon without evidence of invasion or obstruction. This case was reviewed in consultation with colleagues. Echocardiogram/EKG results:  No results found for this visit on 07/20/21. EKG Results     None          No results found for this or any previous visit. Procedures done this admission:  * No surgery found *        Time spent in patient discharge planning and coordination 35 minutes.       Signed By: Mala Worthy MD   [address] [place], [place] [address]ist Service    July 23, 2021  9:05 AM

## 2021-07-23 NOTE — PROGRESS NOTES
Asim Rashid is a 82 yo [employer]ism  female born September 26, 1935 diagnosed with Metastatic Gastric CA. COVID19 Screen - Negative. Pt comes to [facility] via [address]. She is Greek Speaking and also speaks [employer]. She is [place] in both ears and wears glasses. She has no hearing aides. Pt lives alone and is a retired . She is at peace with dying. Pt received Sacrament of the Sick from Fr. Gabriel while she was in the hospital.  PT was alert, verbal, appeared comfortable with no pain level expressed or observed. Pt is of the [facility] [place] and part of the [employer]. No family locally. According to Rn, a family member from [place] may be trying to get here to help care for her at her home. According to [employer], First Contact for pt, pt will not be able to return to her home as it should be condemned. Asim Rashid is worried about things at home as she only had 2 hrs to prepare to come to [facility].  contacted and spoke with Quintin Olivo who stated that she would go to the home with Bhavik Juarez, Second , and the two of them will tend to the items she is worried about. Quintin Olivo requested that I contact Fr. Henry Renae, friend of the pt, because he may be able to calm pt down from her worried/agitated state.  attempted to contact Fr. Gabriel through [facility] and left message on their office line to return his call. No return call has been received prior to this writing.  provided spiritual care through presence, active listening, supportive responses, communication with First Contact, coordination with local clergy, and assurance of prayer. [phone]

## 2021-07-23 NOTE — PROGRESS NOTES
Problem: Pain  Goal: *Control of Pain  Outcome: Progressing Towards Goal  Note: Upon admission pt is not complaining of any pain. Will re-evaluate and update as needed.    Goal: *PALLIATIVE CARE:  Alleviation of Pain  Outcome: Progressing Towards Goal

## 2021-07-23 NOTE — [facility] IDG CHAPLAIN NOTES
Patient: Soo Brasher    Date: 07/23/21  Time: 4:49 PM    [facility]  Notes     will provide spiritual and emotional support according to pt's jessie tradition and assist with bereavement needs as appropriate.  will collaborate with Team members to provide good help. Initial Plan of Care was not complete upon this writing and  will review upon its completion.           Signed by: Ina Kelley

## 2021-07-23 NOTE — PROGRESS NOTES
will provide spiritual and emotional support and learn of Sabianist/spiritual resources according to their jessie tradition through presence, pastoral conversation, scripture reading/sharing and prayer as needed/requested.

## 2021-07-23 NOTE — PROGRESS NOTES
TRANSFER - OUT REPORT:    Verbal report given to Alex Dhillon RN(name) on [place]  being transferred to [facility] (unit) for routine progression of care       Report consisted of patients Situation, Background, Assessment and   Recommendations(SBAR). Information from the following report(s) SBAR, Kardex and Recent Results was reviewed with the receiving nurse. Lines:   Peripheral IV 07/21/21 Left Arm (Active)   Site Assessment Clean, dry, & intact 07/23/21 0929   Phlebitis Assessment 0 07/23/21 0929   Infiltration Assessment 0 07/23/21 0929   Dressing Status Clean, dry, & intact 07/23/21 0929   Dressing Type Tape;Transparent 07/23/21 0929   Hub Color/Line Status Capped 07/22/21 1604   Alcohol Cap Used No 07/23/21 0929        Opportunity for questions and clarification was provided.       Patient transported with:   Ambulance to [facility]

## 2021-07-23 NOTE — PROGRESS NOTES
[phone] Pt admitted from [facility] via EMS to room 110 as Domiciliary care with hospice dx of Metastatic Gastric Cancer. Pt helped with transfering from stretcher to bed. Pt is very hard of hearing bilaterally and does not have hearing aids, Pt wears glasses. Pt was accompanied by Eliceo Ojeda, her friend that is on contact list, and her . Pt denies pain. Simple assessment completed. Bed alarm is in place with tab alert and pt instructed not to get out of bed without using call bell which is clipped to her gown, Pt voiced understanding. Pt has large red area on her buttock but no skin breakdown, barrier cream applied. Bed is low and locked, call bell in reach, tab alert in place, side rails up x2.     1230 Pt is resting in bed watching TV. Safety measures in place. Door remains open for continued monitoring. 1500 Pt set tab alert off by getting OOB without using call bell. Other RN assisted pt to bathroom and back to bed. Pt instructed to use call bell prior to getting out of bed. Pt voiced understanding. 0 Pt states Sanam Haley are you keeping me a prisoner when I want to go home. \" Pt was reminded that she [place] [facility] and will spending the night here. Pt states she has a relative coming over from [facility] and will be staying in the room with her. Friend on contact sheet is Eliceo Ojeda and is a neighbor of the patient who helps with her care for the last six weeks. Shane Cools states pt's house is not in any condition for patient to live in with dog feces and hoarding. Pt is calmer when Shane Cools and her  are at the bedside. 1700 Pt refuses to eat \"American food. \" Georgiaparth Meuse offered to go get food for the patient and bring it in for her when they were here earlier but pt refused. Pt states she will call the police because she is being held against her will. Pt was reminded of where she was and why. Pt states she wants to go home.  was in earlier to visit with the patient.  Safety measures in place. Door remains open for continued monitoring. Pt is upset because there are no [place] channels on the cable TV.    1800 Pt continues to say she will call police, neighbors are back at the bedside and are attempting to convince the patient that she will stay here tonight and offered again to get her food. Pt refuses \"American Food. \" Safety measures in place. Door remains open for continued monitoring. 1850 Pt was medicated with Haldol 2mg SQ in left arm with two nurses at bedside and two aids attempting to calm down the patient and reason with her. Safety measures in place. Door remains open for continued monitoring. 1911 Scheduled dose of Zyprexa 5mg PO given crushed and in syringe. Pt continues to state she does not want to stay here and will \"pay extra\" to leave. Pt re-oriented again with three staff members. Safety measures in place. Door remains open for continued monitoring. Report given to Elias Leal RN.

## 2021-07-23 NOTE — PROGRESS NOTES
Care Management Interventions  Mode of Transport at Discharge: Self  Transition of Care Consult (CM Consult): Hospice House  Current Support Network: Lives Alone  Confirm Follow Up Transport: Self  Discharge Location  Discharge Placement: Other: [facility]

## 2021-07-24 PROCEDURE — G0299 HHS/HOSPICE OF RN EA 15 MIN: HCPCS

## 2021-07-24 PROCEDURE — 74011250637 HC RX REV CODE- 250/637: Performed by: INTERNAL MEDICINE

## 2021-07-24 PROCEDURE — 0651 HSPC ROUTINE HOME CARE

## 2021-07-24 RX ADMIN — POLYETHYLENE GLYCOL 3350 17 G: 17 POWDER, FOR SOLUTION ORAL at 10:20

## 2021-07-24 RX ADMIN — PANTOPRAZOLE SODIUM 40 MG: 40 TABLET, DELAYED RELEASE ORAL at 10:20

## 2021-07-24 RX ADMIN — CITALOPRAM HYDROBROMIDE 20 MG: 20 TABLET ORAL at 10:20

## 2021-07-24 NOTE — PROGRESS NOTES
Problem: Pain  Goal: Assess satisfaction of level of comfort and symptom control  Outcome: Progressing Towards Goal  Goal: *Control of acute pain  Outcome: Progressing Towards Goal     Problem: Anticipatory Grief  Goal: Explore reactions to and verbalize acceptance of impending loss  Description: Patient/family/caregiver will explore reactions to and verbalize acceptance of impending loss. Outcome: Progressing Towards Goal     Problem: End of Life Process  Goal: Demonstrate understanding of end of life processes  Description: Patient/caregiver will understand end of life processes.   Outcome: Progressing Towards Goal     Problem: Pain  Goal: *Control of Pain  Outcome: Progressing Towards Goal  Goal: *PALLIATIVE CARE:  Alleviation of Pain  Outcome: Progressing Towards Goal

## 2021-07-24 NOTE — PROGRESS NOTES
[facility] Rupesh Holman [facility]. Patient identified by name and . Patient admitted under Routine  level of care with diagnosis of Metastic gastric cancer. Patient is sitting up in the bed angry and verbally attacking anyone that enters her room. She states she is being held against her will and that she is a prisoner here in this facility. When attempt to defuse the situation she continues with the same talk and anger. She states she wants to go back home that she has the right to do so. She accuses that staff of lying to her and she has been working in the medical field for many years and \"I know what you guys do to people. \" Continued to defuse situation and was able to get her as calm as possible and in bed. Patient is currently on room are and is in no respiratory distress. Patient is on bathroom privileges and needs a one person assist with education on using the call button for when she needs to get up and use the restroom with understanding and agreement voiced. FLAAC 2/10. Patient repositioned for comfort. Reviewed plan of care with CNA for this shift with understanding voiced. Discharge Plan is to remain at [facility] until her demise. Reassessment of proper LOC will be done on an ongoing basis. Safety measures in place including, door open for continuous monitoring, bed in low locked position, tab alert in place, call light within reach, and side rails up x2. Will continue to monitor for changes, safety, and needs. 2240 Patient setting off the alarm multiple times this evening this time trying to get out of the bed. She does not want to be assisted to the restroom but she is unsteady and is a fall risk. Explained the reason this must be done and the importance of using the call bell. She acknowledges but will not agree to use it. Tab alarms are placed on her gown and bed alarm placed on bed as well to alert staff when she is attempting to get out of the bed. FLACC 2/10.  Safety measures remain in place, will continue to monitor for changes, safety, and comfort. 5672 Patient lying in bed with her eyes closed and respirations even and unlabored. Tab alarms and bed alarm remain in place. FLACC 0/10. Safety measures remain in place, will continue to monitor for changes, safety, and comfort. 1820 Patient setting off the alarms trying to get out of the bed. Reinforced the importance of using the call light and she states \"it doesn't work. \" She remains unsteady when ambulating with her needing assistance not to fall several times when ambulating to the bathroom. Tab alarm and bed alarm all set and attached to the patient. FLACC 2/10. Safety measures remain in place, will continue to monitor for changes, safety, and comfort.     BSSR to Riccardo Francis RN

## 2021-07-24 NOTE — PROGRESS NOTES
9123 Bedside Report taken from Gail HUIZAR Rn. Pt identified. Pt in bed with eyes closed; displaying no signs or symptoms of pain, dyspnea, agitation,seizures, nausea, or vomiting. FLACC 0. Bed locked and low, side rails up, tabs/bed alarm in place for pt. safety. Call light with in reach, and door opened for continued monitoring. Care plan reviewed with Cna.     0827 pt sleeping, which I was told she was up all night and she has finally settled. I will give her medications when she wakes up. Pt appears to be comfortable. flacc 0/10.    [id number] Had a very lengthy conversation with the pt (who is alert and oriented X4) and her cousin padmini via what[employer] agueda who is coming in from [facility]. Pt states that she wants euthanasia. I explained to her that, that is something we or anyone else wont be able to help her with in the state of [place]. She then stated that to go home to her house, she doesn't know why she now cant make her own decisions(her niece is only POA when she is not able), that she signed papers to come here but wasn't fully aware of what being detailed and that she has changed her mind about being here. She stated that she wants to gather her things, particularly her money from her house and out of the bank and go back to [facility]. Her family who was on the phone stated that they would be in the \[facility]\"" Monday to take her home. I explained all of this to the MD. He then Saw the pt and let her know that there would be a idg meeting on Monday and they could discuss all of these things then. She agree to stay the weekend and she wanted all mind altering medications stopped. She stated that if she began to hurt tylenol worked just fine. 1050 Pt had miralax this am. Pt on her way to the restroom had a bm on herself and the floor. Pt assisted to the shower and back to bed. Very large brown, black, tarry stool.      [address] came I explained to her that the pt requested that she not visitor or have anything to do with her care. She stated that she understood and graciously was stepping out but wanted to her her key and a card. I asked permission from the pt and she allowed her to do just that and she left. 1340 Pt observed and appears to be sleeping/resting, no facial grimacing, no moaning, easy relaxed respirations. No symptoms to manage at this time. Flacc 0/10    1505 pt requested more pillows.    [id number] [phone] found  for her and plugged her phone up

## 2021-07-24 NOTE — H&P
History and Physical    Patient: Harriett Goddard MRN: [id number]  SSN: [id number]    YOB: 1935  Age: 81 y.o. Sex: female      Subjective:      Harriett Goddard is a 81 y.o. female who has a  history of gastric adenocarcinoma, history of chemotherapy for gastric cancer, currently decided on hospice, currently getting hospice at home presented to emergency room with generalized weakness going on for past 2 weeks duration. Still having dark-colored stools around the same amount. Patient reports she was following at [facility], was getting chemotherapy but not getting any chemotherapy anymore as patient is here for hospice. Main reason for visit today is generalized weakness, would like to get a blood transfusion to improve her hemoglobin so she could fly back to her country to spend the rest of time there. Denies any chest pain, shortness of breath. Denies any nausea, vomiting, hematemesis. Denies any hematuria. [facility] was [facility], due to lack of beds patient is transferred here. Since admission to [place] [address] she is awake alert able to be out of bed and able to eat when she gets her preferred meals. She is ready to die and wants to go home. History reviewed. No pertinent past medical history. History reviewed. No pertinent surgical history. History reviewed. No pertinent family history. Social History     Tobacco Use    Smoking status: Never Smoker    Smokeless tobacco: Never Used   Substance Use Topics    Alcohol use: Not Currently      Prior to Admission medications    Medication Sig Start Date End Date Taking? Authorizing Provider   amLODIPine (NORVASC) 5 mg tablet Take 1 Tablet by mouth daily. Yes Provider, Historical   escitalopram oxalate (LEXAPRO) 10 mg tablet Take 1 Tablet by mouth daily. Yes Provider, Historical   levothyroxine (SYNTHROID) 125 mcg tablet Take 125 mcg by mouth daily.  4/8/21  Yes Provider, Historical losartan (COZAAR) 50 mg tablet Take 50 mg by mouth daily. 12/22/20  Yes Provider, Historical   polyethylene glycol (MIRALAX) 17 gram/dose powder Take 17 g by mouth two (2) times daily as needed. Yes Provider, Historical        Allergies   Allergen Reactions    Atorvastatin Calcium Myalgia     Muscle pain    Carvedilol Other (comments)     Pain and numbness in feet    Clonazepam Myalgia     Muscle pain    Duloxetine Hcl Nausea and Vomiting     Gi upset    Ezetimibe Myalgia     Muscle cramps    Fluoxetine Hcl Other (comments)     Dry mouth    Gabapentin Other (comments)     ineffective    Olanzapine Other (comments)     Dry mouth    Pregabalin Other (comments)     confusion    Sertraline Other (comments)     Anxiety,throbbing    Simvastatin Myalgia     Muscle aches and pain  Muscle aches and pain    Colesevelam Other (comments)     Heartburn    Ezetimibe-Simvastatin Myalgia     Muscle cramps    Fenofibrate Rash    Olanzapine-Fluoxetine Other (comments)     Dry mouth    Penicillins Rash       Review of Systems: The remainder of the admission historical database is as outlined in the Clinical Record. Objective:     Vitals:    07/24/21 0824   BP: (!) 118/58   Pulse: 89   Resp: 14   Temp: 97.6 °F (36.4 °C)        Physical Exam:    Vital signs as outlined above. Skin examination reveals no rashes or infiltrates. Head neck examination reveals no jaundice or adenopathy. Cardiopulmonary examination reveals mild lung congestion with no respiratory distress and a regular rhythm with no cardiac rub. Abdominal examination reveals a fullness but no tenderness or masses. Extremities reveal no edema or inflammatory joint changes. Neurologic examination is physiologic.     Assessment:     Hospital Problems  Date Reviewed: 7/23/2021        Codes Class Noted POA    Bipolar disorder in partial remission [facility]) ICD-10-CM: F31.70  ICD-9-CM: 296.80  7/23/2021 Yes        Encounter for follow-up surveillance of gastric cancer ICD-10-CM: Z08, Z85.028  ICD-9-CM: V67.59, V10.04  7/23/2021 Unknown              Plan:     Current Facility-Administered Medications   Medication Dose Route Frequency    morphine (ROXANOL) concentrated oral syringe 10 mg  10 mg Oral Q30MIN PRN    Or    morphine (ROXANOL) concentrated oral syringe 10 mg  10 mg SubLINGual Q30MIN PRN    acetaminophen (TYLENOL) tablet 650 mg  650 mg Oral Q4H PRN    haloperidoL (HALDOL) tablet 2 mg  2 mg Oral Q1H PRN    acetaminophen (TYLENOL) tablet 650 mg  650 mg Oral Q4H PRN    LORazepam (ATIVAN) tablet 1 mg  1 mg Oral Q4H PRN    polyethylene glycol (MIRALAX) packet 17 g  17 g Oral DAILY    haloperidol lactate (HALDOL) injection 2 mg  2 mg SubCUTAneous Q1H PRN    Or    haloperidol lactate (HALDOL) injection 2 mg  2 mg IntraVENous Q1H PRN    pantoprazole (PROTONIX) tablet 40 mg  40 mg Oral ACB    hyoscyamine SL (LEVSIN/SL) tablet 0.125 mg  0.125 mg SubLINGual Q4H PRN    levothyroxine (SYNTHROID) tablet 125 mcg  125 mcg Oral DAILY    citalopram (CELEXA) tablet 20 mg  20 mg Oral DAILY    OLANZapine (ZyPREXA) tablet 5 mg  5 mg Oral QPM       She very much wants to go home. She indicates that a cousin from [facility] is arriving renal Monday and will be available to take care of her until her death. If we can confirm this then she would likely be ready for discharge and home hospice follow-up.       Signed By: Meaghan Benavidez MD     July 24, 2021

## 2021-07-24 NOTE — PROGRESS NOTES
Problem: Hospice Orientation  Goal: Demonstrate understanding of hospice philosophy, plan of care, and home hospice program  Description: The patient/family/caregiver will demonstrate understanding of hospice philosophy, plan of care and the home hospice program as evidenced by participation in meeting the patient's psychosocial, spiritual, medical, and physical needs inclusive of medical supplies/equipment focusing on symptoms.   Outcome: Resolved/Met     Problem: Pain  Goal: Assess satisfaction of level of comfort and symptom control  Outcome: Progressing Towards Goal

## 2021-07-25 PROCEDURE — G0299 HHS/HOSPICE OF RN EA 15 MIN: HCPCS

## 2021-07-25 PROCEDURE — 0651 HSPC ROUTINE HOME CARE

## 2021-07-25 PROCEDURE — 74011250637 HC RX REV CODE- 250/637: Performed by: INTERNAL MEDICINE

## 2021-07-25 RX ADMIN — CITALOPRAM HYDROBROMIDE 20 MG: 20 TABLET ORAL at 09:55

## 2021-07-25 RX ADMIN — PANTOPRAZOLE SODIUM 40 MG: 40 TABLET, DELAYED RELEASE ORAL at 09:55

## 2021-07-25 RX ADMIN — ACETAMINOPHEN 650 MG: 325 TABLET ORAL at 17:47

## 2021-07-25 NOTE — PROGRESS NOTES
Problem: Pain  Goal: *Control of Pain  Outcome: Progressing Towards Goal     Problem: Patient Education: Go to Patient Education Activity  Goal: Patient/Family Education  Outcome: Resolved/Met     Problem: Falls - Risk of  Goal: *Absence of Falls  Description: Document Rodolfo Fall Risk and appropriate interventions in the flowsheet.   Outcome: Progressing Towards Goal  Note: Fall Risk Interventions:  Mobility Interventions: Bed/chair exit alarm, Patient to call before getting OOB         Medication Interventions: Bed/chair exit alarm, Patient to call before getting OOB, Teach patient to arise slowly    Elimination Interventions: Bed/chair exit alarm, Call light in reach, Patient to call for help with toileting needs              Problem: Patient Education: Go to Patient Education Activity  Goal: Patient/Family Education  Outcome: Progressing Towards Goal

## 2021-07-25 NOTE — PROGRESS NOTES
Problem: Pain  Goal: Assess satisfaction of level of comfort and symptom control  Outcome: Progressing Towards Goal  Goal: *Control of acute pain  Outcome: Progressing Towards Goal     Problem: Anticipatory Grief  Goal: Explore reactions to and verbalize acceptance of impending loss  Description: Patient/family/caregiver will explore reactions to and verbalize acceptance of impending loss.   Outcome: Progressing Towards Goal     Problem: Pain  Goal: *Control of Pain  Outcome: Progressing Towards Goal  Goal: *PALLIATIVE CARE:  Alleviation of Pain  Outcome: Progressing Towards Goal

## 2021-07-25 NOTE — PROGRESS NOTES
On call SW received a call from [facility] charge nurse, Patricia Walker reporting that Pt is requesting that her friend, Avelino Lisa return the 5000.00 she had given to hold for her. Pranav also reported that Siva Pepe is willing to bring the money to the Pt today but would like some type of documentation that the money has been returned to the Pt. SW spoke with Siva Pepe via TC to let her know that SW would be willing to write a brief letter showing that she returned Pt.'s funds to her today and that the letter would be signed by her, Pt and witnessed by the [facility] charge nurse and myself. SW created a brief letter outlining that the money was returned to Pt today per her request. Melissa Weinberg, [facility], [facility] nurse, Karen Cobos and myself met in Pt.'s room so that Pt.'s 5000.00 could be returned to her (John Gray counted the funds with Siva Pepe, [facility] and myself witnessing the amount of 50 100.00 bills). The letter was signed by Siva Pepe, [address] and witnessed by [address] [place] [address] and myself - the original copy letter will be placed in Pt.'s record, a copy was provided to Siva Pepe and a copy will be placed with the funds in the [facility] safe per Pranav.

## 2021-07-25 NOTE — PROGRESS NOTES
[address] [facility] Patient identified by name and . Patient admitted under routine level of care with diagnosis of metastatic gastric cancer. Patient is sitting up in bed watching television. She is in good spirits today and states she is feeling better about her circumstances and is waiting for her family to arrive on Monday. She denies any signs or symptoms of pain, anxiety, agitation, dyspnea, or nausea/vomiting. She is currently on room air and does not have any respiratory distress. She has bathroom privileges and once she has passed urine it is clear yellow. She denies any current pain and states Tylenol eases her pain and that is all she takes. FLAAC 0/10. Reviewed plan of care with CNA for this shift with understanding voiced. Discharge Plan is to remain at [facility] until her discharge on 21. Reassessment of proper LOC will be done on an ongoing basis. Safety measures in place including, door open for continuous monitoring, bed in low locked position, tab alert in place, call light within reach, and side rails up x2. Will continue to monitor for changes, safety, and needs. 2226 Patient is lying in bed with eyes closed and respirations are even and unlabored. FLACC 0/10. Safety measures remain in place, will continue to monitor for changes, safety, and comfort. 9396 Patient's bed alarm going off, once in the room the patient is trying to get the tab alarm off so she can get up and walk to the bathroom. Assisted patient with minimal assistance required to help her get to the bathroom. Patient has clear yellow odorous urine observed. Assisted her back to bed and helped arrange blankets until she was comfortable. FLACC 0/10. Safety measures remain in place, will continue to monitor for changes, safety, and comfort. 0186 Patient requesting assistance to go to the restroom. Once she is up on her feet she is getting more steady on her feet.  Patient assisted to sit on the toilet then given her privacy. Once she is done she is assisted back to bed and helped to get comfortable. She denies any other current needs at this time. FLACC 0/10. Safety measures remain in place, will continue to monitor for changes, safety, and comfort.     BSSR given to David Snow RN

## 2021-07-25 NOTE — PROGRESS NOTES
6635-Report received from Sandra Lee RN. Patient identified by name and . Patient resting quietly in bed with eyes closed. No signs or symptoms of distress, pain, agitation/anxiety, or SOB noted at present. Bed locked and in lowest position, side rails up x 2, call bell within reach, tab alarm in place. No family present at bedside. Door open for continuous monitoring. 1920-Patient admitted to [facility] on 2021 with a terminal diagnosis of Gastric cancer. Patient is Routine level of care. Patient is alert and oriented x 3. Patient able to get up with stand by assist. Appetite remains fair. Patient is continent of bowel and bladder. Patient will remain in [facility] under Routine LOC until 2021. Will continue to evaluate patient D/C plan for potential change of LOC. Patient has hx of gastric cancer and had been getting chemo therapy but is no longer receiving therapy and had recently been getting hospice services at home. Patient presented to the hospital this past hospital stay for weakness and dark-colored stools. Patient received blood transfusions during her hospitalization. Patient looking forward to spending end of life care in [facility] to her country. No immediate needs seen at this time. 2250-Patient resting quietly in bed with eyes closed. Respirations unlabored. No immediate needs noted. FLACC 0.    2356-Nurse entered into patient's room after being called by patient. Patient voiced needing to use bathroom. Stand-by assist to patient. Unsteady gait noted. Void x 1. Patient ambulated safely back to bed. No immediate needs voiced. FLACC 0.    0347-Nurse entered into patient's room after patient's pressed call light. Patient voiced needing to go to the bathroom. Patient continues to have unsteady gait. Nurse was a stand-by assist as patient ambulated. Void x 1. Patient ambulated safely back to bed. No immediate needs voiced or seen. 0633-Patient resting quietly in bed with eyes closed.  Respirations unlabored. No immediate needs noted. FLACC 0.     Report given to Ab Couch RN

## 2021-07-25 NOTE — PROGRESS NOTES
2105 Bedside Report taken from [facility], Pt identified. Pt in bed with eyes closed; displaying no signs or symptoms of pain, dyspnea, agitation,seizures, nausea, or vomiting. FLACC 0. Bed locked and low, side rails up, tabs/bed alarm in place for pt. safety. Call light with in reach, and door opened for continued monitoring. Care plan reviewed with Cna.   9411 pt now awake. She was assisted X1 (stand by)to the bathroom. Washed her face and helped to the chair. Po medications given. 1100 called received from [address] that she would be bring ing cash to [place].  called to facilitate that.   0885 cash counted by myself and drae in front of pt, ramirez, and juanpablo. They signed paper work and we placed it in sealed green bag and placed in front of pt in silver box. 1415 spoke with Lesley Alvarez, and padmini to make sure everyone understood what was to go in meet tomorrow. They all wanted to know if it would be possible for pt to receive some blood and iron for her voyage back home to [facility]. I explained to them that I was not able to make any of those decisions but they could discuss it in meeting tomorrow. We attempted to give th cash received earlier to [facility]. Her and henok agreed to leave it int he locked box until [address] arrived tomorrow. They came up with the plan to take henok home to [facility] and if not then Krunal Khan would stay with henok at home until her demise. 1615 Pt observed and appears to be sleeping/resting, no facial grimacing, no moaning, easy relaxed respirations. No symptoms to manage at this time. Flacc 0/10.  1738 Pt requested pain medication \"tylenol\" for back pain 4/10.  Pt watching tv.  1835report given to Nilay Gates Rn

## 2021-07-26 PROCEDURE — G0299 HHS/HOSPICE OF RN EA 15 MIN: HCPCS

## 2021-07-26 PROCEDURE — 0651 HSPC ROUTINE HOME CARE

## 2021-07-26 PROCEDURE — 74011250637 HC RX REV CODE- 250/637: Performed by: INTERNAL MEDICINE

## 2021-07-26 RX ADMIN — CITALOPRAM HYDROBROMIDE 20 MG: 20 TABLET ORAL at 07:55

## 2021-07-26 RX ADMIN — PANTOPRAZOLE SODIUM 40 MG: 40 TABLET, DELAYED RELEASE ORAL at 07:55

## 2021-07-26 NOTE — PROGRESS NOTES
Problem: Anticipatory Grief  Goal: Grief heard and acknowledged, anxiety reduced, patient coping identified, patient/family expressed gratitude  Outcome: Progressing Towards Goal  Note: Sandi Diego and family/ will receive spiritual and emotional care, grief acknowledged and anxiety reduced using her spiritual tradition during current certification period. Fabi/contact persons will express gratitude for visit.

## 2021-07-26 NOTE — HSPC IDG CHAPLAIN NOTES
Patient: Nicanor Riley    Date: 07/26/21  Time: 8:41 AM    \[facility] [place] [facility]\""  Notes     has read and reviewed Plan of Care checking on updates.  will provide spiritual and emotional care collaborating with Team to provided Good Help.  will assist with bereavement needs as appropriate.         Signed by: Iwona Pearson

## 2021-07-26 NOTE — PROGRESS NOTES
Demographics     Information provided by: Pt and chart       Name: Samuel Iraheta                                                                    Level of Care  GIP [] Routine []  Respite  []  Domiciliary LOC        From the in home program Yes [] No [x]  Team                                                        Diagnosis; Gastric cancer        Insurance    Medicare  [x]  Medicaid  []  Blue Cross   []   Other  []         Social     []  Single  []     []   [x]    Spouse name: Pt.'s  Augustine Jasmine passed way in 2010. Length of marriage   Children: None                    Community Resources Used in the Home prior to admission Yes []  No [x]      Financial Concerns :                     Elizabeth Application Needed   Yes []  No  [x]  Medicaid application needed Yes []  No  [x]                                 IFA Form Complete  Yes  [x]  No [x]    Discharge Plans: Pt is hoping to return to [place] or to her home in [place] (defintive plans are TBD when her 2nd cousin, Bryson Saini arrives from [place] Tuesday evening - family mtg. Is scheduled for Wednesday at 11 AM). Work History :  Retired [x] [employer] [] Part-time [] Disabled []    Retired      [employer]   Yes []  No [x]  [employer]  []  [employer] []  [employer] []  [facility] []  [employer] []  [employer] []  Linked to [place]   Yes []  No []  Referral made to Surya Yes []  No []        [address] in the system     Living Will  Yes []   No []   HCPOA     Yes []  No []  DPOA        Yes []   No []  DNR           Yes [x]  No  []    Pt.'s niece, Jalyn Thomas is DPOA per Pt (lives in [place]). Spiritual / Mandaen Support: Mayra Andrade of [facility] [employer]        Final Arrangements: Undecided.       Medical History and/or Narrative copied from the patients chart from the Admitting Physician or Rn:    Haile Thornton is a 81 y.o. female who has a  history of gastric adenocarcinoma, history of chemotherapy for gastric cancer, currently decided on hospice, currently getting hospice at home presented to emergency room with generalized weakness going on for past 2 weeks duration.  Still having dark-colored stools around the same amount.  Patient reports she was following at [facility], was getting chemotherapy but not getting any chemotherapy anymore as patient is here for hospice.  Main reason for visit today is generalized weakness, would like to get a blood transfusion to improve her hemoglobin so she could fly back to her country to spend the rest of time there.  Denies any chest pain, shortness of breath.  Denies any nausea, vomiting, hematemesis.  Denies any hematuria.  Patient was initially evaluated at [facility] emergency room, due to lack of beds patient is transferred here.      Since admission to [place] [id number] she is awake alert able to be out of bed and able to eat when she gets her preferred meals. She is ready to die and wants to go home        Mental Health History: none         Volunteer discussion: Yes [x]    No [x]      Goals of care for the patient and family: Pt is at peace with her diagnosis/prognosis and is hoping to return to [place] with her 2nd cousin or return to her home in [place]. Coping and Bereavement: Pt appears to be coping well at this time.                              Was a Referral made to Bereavement  Yes  No x

## 2021-07-26 NOTE — PROGRESS NOTES
Problem: Pain  Goal: *Control of Pain. Pt will report pain relief with interventions. roxanol 10 mg q 30 min prn  Outcome: Progressing Towards Goal     Problem: Falls - Risk of  Goal: *Absence of Falls  Description: patient will not have any falls or injuries during shift. Outcome: Progressing Towards Goal  Note: Fall Risk Interventions:  Mobility Interventions: Bed/chair exit alarm         Medication Interventions: Bed/chair exit alarm, Evaluate medications/consider consulting pharmacy    Elimination Interventions: Bed/chair exit alarm, Toileting schedule/hourly rounds, Patient to call for help with toileting needs         Problem: Pressure Injury - Risk of  Goal: *Prevention of pressure injury  Description: patient will not develop any pressure injuries during stay.   Outcome: Progressing Towards Goal  Note: Pressure Injury Interventions:  Sensory Interventions: Assess changes in LOC, Check visual cues for pain, Float heels, Keep linens dry and wrinkle-free, Minimize linen layers, Monitor skin under medical devices, Pad between skin to skin    Moisture Interventions: Apply protective barrier, creams and emollients, Assess need for specialty bed, Limit adult briefs, Maintain skin hydration (lotion/cream), Minimize layers, Moisture barrier    Activity Interventions: Assess need for specialty bed    Mobility Interventions: Assess need for specialty bed, Float heels, HOB 30 degrees or less    Nutrition Interventions: Document food/fluid/supplement intake    Friction and Shear Interventions: Apply protective barrier, creams and emollients, Lift sheet, Minimize layers

## 2021-07-26 NOTE — PROGRESS NOTES
Bedside report received from off-going RN visual identification made, assumed care of pt. Pt resting quietly with eyes closed, no agitation or restlessness, no grimacing or groaning. Pt respirations unlabored. Tab alert in place, rails up x 2, bed in lowest position, safety maintained. FLACC 0. Pt is at routine level of care, no change in discharge plan. Continue to look for placement for patient. Plan of Care reviewed with CNA.    2106-Pt resting comfortably in bed with eyes closed; no signs of pain or distress noted. RR non labored. No agitation, NVD, or SOB. FLACC 0/10.    2309-Pt resting comfortably in bed with eyes closed; no signs of pain or distress noted. RR non labored. No agitation, NVD, or SOB. FLACC 0/10.    0125-Pt resting comfortably in bed with eyes closed; no signs of pain or distress noted. RR non labored. No agitation, NVD, or SOB. FLACC 0/10.    0326-Pt resting comfortably in bed with eyes closed; no signs of pain or distress noted. RR non labored. No agitation, NVD, or SOB. FLACC 0/10.    0530-Pt resting comfortably in bed with eyes closed; no signs of pain or distress noted. RR non labored. No agitation, NVD, or SOB. FLACC 0/10.    0319- Call to room by pt asking for pain medication for abdominal pain.  PRN roxanol givne

## 2021-07-26 NOTE — [facility] IDG SOCIAL WORKER NOTES
MSW has reviewed and agreed with SN initial comprehensive assessment and plan of care. Initial SWA visit will be completed within 5 days of admission.

## 2021-07-26 NOTE — PROGRESS NOTES
Problem: General Wound Care  Goal: *Non-infected wound: Improvement of existing wound, absence of infection, and maintenance of skin integrity    Outcome: Progressing Towards Goal  Note: Stage 1 to sacrum. Wound care per orders.

## 2021-07-26 NOTE — PROGRESS NOTES
Problem: Pain  Goal: *Control of Pain  Outcome: Progressing Towards Goal     Problem: Falls - Risk of  Goal: *Absence of Falls  Description: Document Rodolfo Fall Risk and appropriate interventions in the flowsheet.   Outcome: Progressing Towards Goal  Note: Fall Risk Interventions:  Mobility Interventions: Bed/chair exit alarm, Patient to call before getting OOB         Medication Interventions: Bed/chair exit alarm, Patient to call before getting OOB, Teach patient to arise slowly    Elimination Interventions: Bed/chair exit alarm, Call light in reach, Patient to call for help with toileting needs

## 2021-07-26 NOTE — PROGRESS NOTES
0700- report received, patient rounding made. Patient is in restroom at this time with assistance from CNA. Will continue to assess needs. Door left open for continuous monitoring. Care plan reviewed with CNA.     6780-  Patient awake in bed. scheduled medications administered. Patient denies any needs at this time. Bed low and locked, tab alert in place, call light within reach. Door left open for continuous monitoring. 1100- pt two friends at bedside awaiting IDG meeting. No needs expressed at this time. 1317- patient sleeping. Update from IDG meeting: Pt is currently under routine level of care. Awaiting pt cousin to arrive from [facility] (expected tomorrow 5-6pm). Meeting will be held on Wednesday at 6 with pt cousin to determine d/c plan. Pt wishes to go home with hospice care or to [facility] if possible. 1510- patient sleeping. Friends have gone. Tab alert in place and call light within reach. 1430- patient repositioned in bed. Nonblanchable redness noted to sacrum and barrier cream applied. Pt denies need for pain medicine. Heated up some lentil soup for patient to eat. 1700- Patient resting with eyes closed. No s/sx of pain or distress.     Report given to Vicente Black

## 2021-07-27 PROBLEM — C16.9 METASTASIS FROM GASTRIC CANCER (HCC): Status: ACTIVE | Noted: 2021-07-27

## 2021-07-27 PROBLEM — C79.9 METASTASIS FROM GASTRIC CANCER (HCC): Status: ACTIVE | Noted: 2021-07-27

## 2021-07-27 PROBLEM — Z51.5 HOSPICE CARE PATIENT: Status: ACTIVE | Noted: 2021-07-27

## 2021-07-27 PROCEDURE — 74011250637 HC RX REV CODE- 250/637: Performed by: INTERNAL MEDICINE

## 2021-07-27 PROCEDURE — 0651 HSPC ROUTINE HOME CARE

## 2021-07-27 PROCEDURE — G0299 HHS/HOSPICE OF RN EA 15 MIN: HCPCS

## 2021-07-27 RX ADMIN — CITALOPRAM HYDROBROMIDE 20 MG: 20 TABLET ORAL at 11:16

## 2021-07-27 RX ADMIN — MORPHINE SULFATE 10 MG: 10 SOLUTION ORAL at 05:55

## 2021-07-27 RX ADMIN — PANTOPRAZOLE SODIUM 40 MG: 40 TABLET, DELAYED RELEASE ORAL at 11:16

## 2021-07-27 NOTE — PROGRESS NOTES
Report received from off-going nurse,Pattie Pittman RN visual identification made, assumed care of pt. Pt resting quietly with eyes closed, no agitation or restlessness, no grimacing or groaning. Pt respirations unlabored. Tab alert in place, rails up x 2, bed in lowest position, safety maintained. FLACC 0.    1530 pt up to bathroom with one assist. She asked what time it is, when told she was surprised. 1720 pt resting quietly, no grimacing, groaning or agitation.      Report given to Romain Moore RN

## 2021-07-27 NOTE — PROGRESS NOTES
0700- Report received, patient resting quietly in bed with no s/sx pain or distress. Call light within reach. Bed is low and locked, tab alert in place, and door left open for continuous monitoring. Care plan reviewed with CNA. Pt is currently under routine level of care. Awaiting pt cousin to arrive from [facility] (expected today 5-6pm). Meeting will be held on Wednesday at 11 am with pt cousin to determine d/c plan. Pt wishes to go home with hospice care or to [facility] if possible. 46- pt continues to sleep    1030- pt continues to sleep    1116-  Patient awakened and repositioned. Pt likes to sit/ lie on lots of pillows. Scheduled meds given whole. Pt denies pain. No needs noted. Agreed to shower after lunch. Barrier cream applied to sacrum. Blanchable redness noted. 1355- Patient resting with eyes closed. No s/sx of pain or distress.   Report given to Ceferino Gould

## 2021-07-28 PROCEDURE — 74011250637 HC RX REV CODE- 250/637: Performed by: INTERNAL MEDICINE

## 2021-07-28 PROCEDURE — 0651 HSPC ROUTINE HOME CARE

## 2021-07-28 RX ADMIN — PANTOPRAZOLE SODIUM 40 MG: 40 TABLET, DELAYED RELEASE ORAL at 08:30

## 2021-07-28 RX ADMIN — CITALOPRAM HYDROBROMIDE 20 MG: 20 TABLET ORAL at 08:30

## 2021-07-28 NOTE — PROGRESS NOTES
08331 Received report from off going RN  Identified pt by name and date of birth. routine level of care admitted for hospice dx of metastatic gastric adenocarcinoma   Pt plans to remain at [facility] until passing. LOC will be reviewed often for signs of change in LOC   Plan of Care was reviewed. Safety measures such as tab alert,  bed in low/locked position , side rails x 2 ,  Call bell in reach , door open and close to the nurses station are in place. Pt resting in bed with eyes closed. No signs of distress noted. 2100  Pt resting in bed. States she is doing ok. No signs of distress noted. All safety measures in place     0057  Pt resting with eyes closed. No signs of distress  Noted. FLACC 0/10    All safety measures in place. 0437  Pt remains resting comfortably. No signs of pain or agitation. FLACC 0/10  All safety measures in place. 6947 pt remains comfortable resting with eyes closed.   FLACC 0/10  All safety measures in place

## 2021-07-28 NOTE — PROGRESS NOTES
2555: Report received from off going RN. Pt admitted on 7/23/2021 for routine/ Domiciliary care with a hospice diagnosis metastatic gastric cancer. Pt did not require any PRN medications during the night. CNA under RN supervision. Discharge plan at this time is TBD. Family meeting at [address] [facility] [address] today. 0830: Scheduled medications administered whole without difficulty. Pt sitting up in bed eating breakfast. Denies any pain or discomfort. 1000: Received a call from the niece/POA with multiple concerns regarding discharge and personal effects. SW and provider notified up niece's questions and concerns. Family meeting at 11am to discuss pt needs and disposition. 1248: Pt's money removed from safe and given to pt upon request witnessed by SW. Paperwork on chart for removal. Pt continues to deny any pain or discomfort. Room temperature water given upon request. No other voiced requests at this time. Visitors in room with patient. 1630: Call from patients stepsister received at the desk. Unable to provide privacy code so no information given. Requested to be transferred to the room but the pt stated she did not want to talk and to tell her she was sleeping. Pt visiting with family from [facility]. Denies any pain or discomfort at this time. 1725: Report given to oncoming RN.

## 2021-07-28 NOTE — PROGRESS NOTES
11:00 AM - Family mtg with Pt, her friend Zee Min, Pt.'s \"2nd cousin\" Jim Rodriguez who flew in from [facility] yesterday and Pt.'s niece/POA, Jason Mcmanus participated via TC/face time. Pt was alert, fully oriented, pleasant and conversant throughout the visit. . On Monday, Pt was talking about returning to [facility] with her \"2nd cousin\", Lima Chapa or returning to her home in [place] with hospice [facility] services and Lima Chapa staying with her at home to provide 24/7 care. It was reported during today's meeting that Pt.'s \"2nd cousin\", Lima Chapa \"is only here for a visit and to say goodbye\" and that she will be returning home to [facility]. Gilberto Hester mentioned during the discussion that she visited her aunt three weeks ago and offered to make arrangements to have her come and live in a [facility] in [place] near [facility] but that her aunt declined the offer. SW encouraged Pt and Gilberto Hester to talk one on one and the rest of us stepped out of the room allowing them some time to speak in private (Pt and her niece both agreed and requested some time to talk). Sean Vergara and Lima Chapa returned to Pt.'s room after Pt finished speaking with [employer]. Pt told us that [employer]to [employer] is encouraging her to move to [facility] in [place] near where Gilberto Hester lives and told me that the reason she didn't take [employer]to [employer] up of this offer 3 weeks ago because \"I didn't want to be a burden on her and she's also pregnant\". Pt told us that after speaking with [employer]to [employer] today that she has now decided to take [address] up on her offer and move to the [facility] near where Gilberto Hester lives along with several other family members.  SW asked Pt if it would be ok to call and speak with Gilberto Hester after our meeting and she agreed with this request. SW spoke with Gilberto Hester via TC to discuss today's meeting and her aunts decision to move to the [facility] in [place] Zack Morenoer shared that she's a nurse at a local nursing home, knows the owner of the [facility] and is also familiar with two hospice agencies near her home in [place]). Faizan Courtney is planning to begin the process of looking into making arrangements for the move, which would include checking into bed availability at the [facility], the process of admission to hospice services in the [facility] and taking care of concerns that Pt has with selling her home in [place], transferring her finances to a financial institution in [place], etc. Pt and her niece are aware that Pt can stay in the [facility] under domiciliary LOC paying the daily R&B rate of 256.00/day until arrangements are made for Pt.'s move to [place].

## 2021-07-28 NOTE — PROGRESS NOTES
Problem: Anticipatory Grief  Goal: Explore reactions to and verbalize acceptance of impending loss  Description: Patient/family/caregiver will explore reactions to and verbalize acceptance of impending loss. Outcome: Progressing Towards Goal     Problem: Coping and Emotional Distress  Goal: Demonstrate acceptance of terminal illness and understanding of disease progression  Description: Patient/family/caregiver will demonstrate acceptance of terminal disease and understanding of disease progression while employing appropriate coping mechanisms. Outcome: Progressing Towards Goal     Problem: End of Life Process  Goal: Demonstrate understanding of end of life processes  Description: Patient/caregiver will understand end of life processes. Outcome: Progressing Towards Goal     Problem: Spiritual Evaluation  Goal: Identify beliefs/practices that support hospice experience  Description: Patient/family identify their beliefs/practices that impair Hospice experience. Patient/family identify their beliefs/practices that support Hospice experience. Patient coping identified. Spiritual distress identified and decreased with visit. Outcome: Progressing Towards Goal     Problem: Pain  Goal: *Control of Pain  Outcome: Progressing Towards Goal     Problem: Falls - Risk of  Goal: *Absence of Falls  Description: Document Rodolfo Fall Risk and appropriate interventions in the flowsheet.   Outcome: Progressing Towards Goal  Note: Fall Risk Interventions:  Mobility Interventions: Bed/chair exit alarm         Medication Interventions: Bed/chair exit alarm, Evaluate medications/consider consulting pharmacy, Patient to call before getting OOB    Elimination Interventions: Bed/chair exit alarm, Patient to call for help with toileting needs, Toileting schedule/hourly rounds, Call light in reach              Problem: Patient Education: Go to Patient Education Activity  Goal: Patient/Family Education  Outcome: Progressing Towards Goal     Problem: Anticipatory Grief  Goal: Grief heard and acknowledged, anxiety reduced, patient coping identified, patient/family expressed gratitude  Outcome: Progressing Towards Goal     Problem: Pressure Injury - Risk of  Goal: *Prevention of pressure injury  Description: Document Ramesh Scale and appropriate interventions in the flowsheet.   Outcome: Progressing Towards Goal  Note: Pressure Injury Interventions:  Sensory Interventions: Assess changes in LOC, Check visual cues for pain, Float heels, Keep linens dry and wrinkle-free, Minimize linen layers, Monitor skin under medical devices, Pad between skin to skin    Moisture Interventions: Apply protective barrier, creams and emollients, Assess need for specialty bed, Limit adult briefs, Maintain skin hydration (lotion/cream), Minimize layers, Moisture barrier    Activity Interventions: Assess need for specialty bed    Mobility Interventions: Assess need for specialty bed, Float heels, HOB 30 degrees or less    Nutrition Interventions: Document food/fluid/supplement intake    Friction and Shear Interventions: Apply protective barrier, creams and emollients, Lift sheet, Minimize layers                Problem: Patient Education: Go to Patient Education Activity  Goal: Patient/Family Education  Outcome: Progressing Towards Goal     Problem: General Wound Care  Goal: *Non-infected wound: Improvement of existing wound, absence of infection, and maintenance of skin integrity  Outcome: Progressing Towards Goal  [facility] availability  Goal: Interventions  Outcome: Progressing Towards Goal

## 2021-07-29 PROCEDURE — 0651 HSPC ROUTINE HOME CARE

## 2021-07-29 PROCEDURE — 74011250637 HC RX REV CODE- 250/637: Performed by: INTERNAL MEDICINE

## 2021-07-29 RX ADMIN — CITALOPRAM HYDROBROMIDE 20 MG: 20 TABLET ORAL at 08:39

## 2021-07-29 RX ADMIN — MORPHINE SULFATE 10 MG: 10 SOLUTION ORAL at 02:12

## 2021-07-29 RX ADMIN — PANTOPRAZOLE SODIUM 40 MG: 40 TABLET, DELAYED RELEASE ORAL at 08:39

## 2021-07-29 NOTE — PROGRESS NOTES
1900 Walking rounds completed with Jayy Apple RN. Pt identified by name and . Pt admitted 2021 under routine care with a hospice diagnosis of metastasis from gastric cancer. Pt alert and oriented; able to voice needs and concerns. Pt requires assistance with ADLs and transfers. Continent of b/b. Pt resting in bed with eyes closed and family at bedside. No signs of pain or distress noted. RR even and non labored on room air. No signs of NVD or SOB. FLACC 0/10. Bed in lowest and locked position with all safety measures in place. Pt is at routine level of care, no change to discharge plan. Pt to remain at [facility] until passing. Plan of Care reviewed with CNA.  Pt in bed with eyes closed resting peacefully. No signs of pain, agitation, NVD, or SOB noted. RR non labored. FLACC 0/10.  No change in condition. No current needs noted.     2323 Pt resting in bed with eyes closed and family at bedside. No signs of pain or distress noted. RR even and non labored on room air. No signs of NVD or SOB. FLACC 010.     0122 Pt resting quietly in bed with eyes closed; no signs of pain or discomfort. RR even and non labored on room air. No signs of NVD or SOB. FLACC 010.     6948 Nurse alerted to patient's room and requested pain medication. Pt given PRN Roxanol given to manage pain. RR even and non labored on room air. No signs of NVD or SOB. FLACC 5/10. 02    0241  Pt resting quietly in bed with eyes closed; no signs of pain or discomfort. RR even and non labored on room air. No signs of NVD or SOB. FLACC 010.     0339 Pt resting calmly in bed with eyes closed; no signs of pain or discomfort. RR even and non labored on room air. No signs of NVD or SOB. FLACC 010.     0540  Pt resting quietly in bed with eyes closed; no signs of pain or discomfort. Family at bedside. RR even and non labored on room air. No signs of NVD or SOB. FLACC 0/10. Report given to Jayy Apple RN.

## 2021-07-29 NOTE — PROGRESS NOTES
2901: Report received from off going RN. Pt admitted on 7/23/2021 for routine/ Domiciliary care with a hospice diagnosis metastatic gastric cancer. Pt requested one dose of Morphine Sulfate 10 mg PO for pain during the night. CNA under RN supervision. Discharge plan at this time is for pt to move to an senior living in [place] to be closer to family after she has taken care of her house and moved accounts. She will remain at [facility] under routine level of care and pay the $256/day room and board fee until time of dc. Pt is resting with eyes closed at this time. FLACC score 0/10. Family in room with patient and had stayed the night. 4260: Scheduled medications administered whole without difficulty. Pt denies any pain or discomfort at this time. Family visiting with pt. 1230: Visiting with family. No needs identified. Denies any pain or discomfort. Call bell within reach. Pt gets up ad paul in room. 1400: Remains in room visiting with family. Denies any pain or discomfort. No voiced requests at this time. 1700: Dinner tray in room. Pt without any requests or concerns. Visiting with family. Denies any pain or discomfort. 1840: Report given to oncoming RN.

## 2021-07-30 PROCEDURE — 74011250637 HC RX REV CODE- 250/637: Performed by: INTERNAL MEDICINE

## 2021-07-30 PROCEDURE — 0651 HSPC ROUTINE HOME CARE

## 2021-07-30 PROCEDURE — G0299 HHS/HOSPICE OF RN EA 15 MIN: HCPCS

## 2021-07-30 RX ORDER — LORAZEPAM 1 MG/1
1 TABLET ORAL
Qty: 30 TABLET | Refills: 0 | Status: SHIPPED | OUTPATIENT
Start: 2021-07-30

## 2021-07-30 RX ORDER — MORPHINE SULFATE 100 MG/5ML
10 SOLUTION ORAL
Qty: 30 ML | Refills: 0 | Status: SHIPPED | OUTPATIENT
Start: 2021-07-30 | End: 2021-08-07

## 2021-07-30 RX ORDER — ACETAMINOPHEN 325 MG/1
650 TABLET ORAL
Qty: 60 TABLET | Refills: 0 | Status: SHIPPED | OUTPATIENT
Start: 2021-07-30

## 2021-07-30 RX ORDER — CITALOPRAM 20 MG/1
20 TABLET, FILM COATED ORAL DAILY
Qty: 30 TABLET | Refills: 0 | Status: SHIPPED | OUTPATIENT
Start: 2021-07-31

## 2021-07-30 RX ORDER — PANTOPRAZOLE SODIUM 40 MG/1
40 TABLET, DELAYED RELEASE ORAL
Qty: 30 TABLET | Refills: 0 | Status: SHIPPED | OUTPATIENT
Start: 2021-07-31

## 2021-07-30 RX ORDER — LEVOTHYROXINE SODIUM 125 UG/1
125 TABLET ORAL DAILY
Qty: 30 TABLET | Refills: 0 | Status: SHIPPED | OUTPATIENT
Start: 2021-07-30

## 2021-07-30 RX ADMIN — CITALOPRAM HYDROBROMIDE 20 MG: 20 TABLET ORAL at 10:15

## 2021-07-30 RX ADMIN — PANTOPRAZOLE SODIUM 40 MG: 40 TABLET, DELAYED RELEASE ORAL at 10:16

## 2021-07-30 NOTE — PROGRESS NOTES
1900 Walking rounds completed with Mirian López RN. Pt identified by name and . Pt admitted 2021 under routine care with a hospice diagnosis of metastasis from gastric cancer. Pt alert and oriented; able to voice needs and concerns. Pt requires assistance with ADLs and transfers. Continent of b/b. Pt resting in bed with eyes closed and family at bedside. No signs of pain or distress noted. RR even and non labored on room air. No signs of NVD or SOB. FLACC 0/10. Bed in lowest and locked position with all safety measures in place. Pt is at routine level of care, no change to discharge plan. Pt to remain at [facility] until passing. Plan of Care reviewed with CNA. 2156 Pt awake in bed with family at bedside. Pt denies pain or discomfort. RR non labored on room air. No signs of NVD ro SOB. FLACC 0/10. No change in condition. No current needs noted. 2302 Pt resting in bed with eyes closed and family at bedside. No signs of pain or distress noted. RR even and non labored on room air. No signs of NVD or SOB. FLACC 0/10. No change in condition. Will continue to monitor. 0116 Assisted pt to restroom and back to bed; no signs of pain or discomfort. RR even and non labored on room air. No signs of NVD or SOB. FLACC 0/10.     0332 Pt resting calmly in bed with eyes closed; no signs of pain or discomfort. RR even and non lNo change in condition. No current needs noted. RR even and non labored on room air. No signs of NVD or SOB. FLACC 0/10.      0544 Pt resting calmly in bed with eyes closed; no signs of pain or discomfort. RR even and non labored on room air. No signs of NVD or SOB. FLACC 0/10. No current needs noted. Will continue to monitor. Walking rounds completed with Princeton Hamman, RN.

## 2021-07-30 NOTE — ADVANCED PRACTICE NURSE
IDG NOTE  7/23: (SFE) Admitted to [facility] with hospice diagnosis of metastatic gastric cancer. Subjective:   Alert and oriented. Anxious. Intake:  Did not eat breakfast this morning. Better intake with outside food. No issues with swallowing. Able to take meds whole. Scheduled medications:  Current Facility-Administered Medications   Medication Dose Route Frequency    pantoprazole (PROTONIX) tablet 40 mg  40 mg Oral ACB    citalopram (CELEXA) tablet 20 mg  20 mg Oral DAILY       PRN medications/symptoms:  None    Wounds:  Wound Sacral/coccyx Mid stage 1 nonblanchable redness (Active)   Wound Etiology Pressure Stage 1 07/29/21 2011   Dressing Status New dressing applied 07/27/21 1137   Cleansed Soap and water 07/28/21 0830   Dressing/Treatment Moisture barrier 07/29/21 2011   Wound Assessment Pink/red 07/28/21 0830   Drainage Amount None 07/28/21 0830   Wound Odor None 07/28/21 0830   Denise-Wound/Incision Assessment Intact 07/28/21 0830   Edges Undefined edges 07/26/21 1637   Number of days: 4       Output: continent x 2 urine    IV access: None    Oxygen:  Room air    Patient Vitals for the past 24 hrs:   Temp Pulse Resp BP   07/29/21 1702 97.5 °F (36.4 °C) 75 16 117/62       Changes in plan of care: Discharge plans in place for 8/1/21. Discharge orders and summary completed. Please see discharge summary for further details. New patient: Comprehensive plan of care reviewed. IDG and pt./family in agreement with plan of care. The IDG identifies through on-going assessment when a change is needed to the POC; the pt/family will receive care and services necessitated by changes in POC. Medications reviewed by the pharmacist and Medical Director.     George BRYAN NP-C  07/30/21

## 2021-07-30 NOTE — HSPC IDG VOLUNTEER NOTES
Patient: Elfego Kerns    Date: 07/30/21  Time: 11:34 AM    [facility] Volunteer Notes  VC's Initial Hospice House IDG Note: (to be used at first [address] on patient)  Juan R 26 of Care Review      Status Codes I = Initiated   NV= No visits per Infection Control Policy or family request     I.  Volunteer      Goal: Hospice house volunteer (s) enhances the quality of remaining life while patient is at the hospice house. Interventions: Johny Richard Volunteer (s) will provide companionship to the patient and/or family by visiting at the hospice house       . Johyn Richard Volunteer (s) will provide respite as needed when requested by patient and/or family. Johny Evans  Volunteer will provide activities such as music, reading, pet therapy, etc. as requested. Johny Evans  Comfort bag delivered. Any other special requests or information regarding volunteer services:    Staff suggested pet therapy visits for socialization and because the pt is missing her dog. Volunteers have been notified to provide pet therapy and companionship visits for socialization. No further needs identified at this time.                   Signed by: Shannan Valladares

## 2021-07-30 NOTE — PROGRESS NOTES
SW received a call from Pt.'s niece, Bradenne Afua reporting that she has made arrangements for Pt.'s move to [place]. Per Angelita Caal, Pt will moving into [facility] (Director, Lori Tyler [phone]) and will also be admitted to [address] [place] [address], [facility]. Coordinator [phone]; fax [phone]) once she arrives in the [place] area. Pt.'s sister-in-law, Josey Jacob (Trena's step-mother) is flying to [place] on Saturday and will be driving Pt back to [place] on Sunday morning after they have a chance to collect some of Pt.'s belongings, etc. from her home here in [place]. NEY also received a call from [facility] with Shwetha Springer requesting that we fax them a copy of the discharge summary once this is completed. NEY visited with Pt and she is aware of and in agreement with the above plan. NEY spoke with Dr. Vinay Jasso, [facility] and [facility], Gerardo Lindsay to make them aware of the above plans. NEY will continue to assist with discharge planning as indicated.

## 2021-07-30 NOTE — HSPC IDG CHAPLAIN NOTES
Patient: Chapin Vyas    Date: 07/30/21  Time: 4:09 AM    [facility]  Notes  Intervention: Spiritual and bereavement assessments completed. Explored Uzma Tradition, provided scripture and prayer. Outcome: Uzma Tradition identified as Yazidism. Patient was provided Anointing while in the hospital.. Support through ministry of presence, prayer and conversation. Plan: Continue to provide support with routine visits, spiritual rituals and conversation.          Signed by: Winkler

## 2021-07-30 NOTE — DISCHARGE SUMMARY
Discharge Summary     Patient: Usama Elise MRN: [id number]  SSN: [id number]    YOB: 1935  Age: 81 y.o. Sex: female       Admit Date: 7/23/2021    Discharge Date: 8/1/21     Admission Diagnoses: Metastatic gastric cancer    Discharge Diagnoses:   Problem List as of 7/30/2021 Date Reviewed: 7/23/2021        Codes Class Noted - Resolved    Bipolar disorder in partial remission ([facility] ICD-10-CM: F31.70  ICD-9-CM: 296.80  7/23/2021 - Present        * (Principal) Metastasis from gastric cancer [facility]) ICD-10-CM: C79.9, C16.9  ICD-9-CM: 199.1, 151.9  7/27/2021 - Present        Hospice care patient ICD-10-CM: Z51.5  ICD-9-CM: V66.7  7/27/2021 - Present        Gastric cancer ([facility] ICD-10-CM: C16.9  ICD-9-CM: 151.9  7/21/2021 - Present        Weakness ICD-10-CM: R53.1  ICD-9-CM: 780.79  7/20/2021 - Present        DVT (deep venous thrombosis) ([facility] ICD-10-CM: I82.409  ICD-9-CM: 453.40  2/17/2021 - Present        Gastric adenocarcinoma ([facility] ICD-10-CM: C16.9  ICD-9-CM: 151.9  12/31/2020 - Present        B12 deficiency ICD-10-CM: E53.8  ICD-9-CM: 266.2  5/3/2018 - Present        Nonrheumatic aortic valve insufficiency ICD-10-CM: I35.1  ICD-9-CM: 424.1  4/6/2017 - Present        Hypothyroidism ICD-10-CM: E03.9  ICD-9-CM: 244.9  10/21/2013 - Present        Generalized anxiety disorder ICD-10-CM: F41.1  ICD-9-CM: 300.02  7/10/2007 - Present        Gastroesophageal reflux disease with hiatal hernia ICD-10-CM: K21.9, K44.9  ICD-9-CM: 530.81, 553.3  2/6/2007 - Present        Chronic obstructive pulmonary disease ([facility] ICD-10-CM: J44.9  ICD-9-CM: 566  3/15/2006 - Present        Essential hypertension ICD-10-CM: I10  ICD-9-CM: 401.9  3/15/2006 - Present            Discharge Condition: SAGE [address] 81 Course: Discharge from: [address] [facility] stay at [facility] to care of her family (sister-in-law Rafita Amaro). Discharge 8/1/21 via family car.  Status at time of discharge is routine. Consults: None    Significant Diagnostic Studies: None    Disposition: Discharge to the care of her family (sister-in-law Brittany Winter). She will be transferring to hospice services with [facility] [facility]. Coordinator [phone]; fax [phone]) and moving into [facility] (Director, Rebekah Denton [phone]) as soon as she arrives in [place], [place]. Discharge Medications:   Current Discharge Medication List      START taking these medications    Details   acetaminophen (TYLENOL) 325 mg tablet Take 2 Tablets by mouth every four (4) hours as needed (mild pain). Qty: 60 Tablet, Refills: 0      LORazepam (ATIVAN) 1 mg tablet Take 1 Tablet by mouth every four (4) hours as needed for Anxiety (nausea, agitation). Qty: 30 Tablet, Refills: 0      citalopram (CELEXA) 20 mg tablet Take 1 Tablet by mouth daily. Qty: 30 Tablet, Refills: 0      morphine (ROXANOL) 20 mg/mL concentrated oral syringe Take 10mg=0.5 mL by mouth every three (3) hours as needed for severe Pain or Shortness of Breath   Qty: 30 mL, Refills: 0      pantoprazole (PROTONIX) 40 mg tablet Take 1 Tablet by mouth Daily (before breakfast). Qty: 30 Tablet, Refills: 0         CONTINUE these medications which have CHANGED    Details   levothyroxine (SYNTHROID) 125 mcg tablet Take 1 Tablet by mouth daily.   Qty: 30 Tablet, Refills: 0         STOP taking these medications       amLODIPine (NORVASC) 5 mg tablet Comments:   Reason for Stopping:         escitalopram oxalate (LEXAPRO) 10 mg tablet Comments:   Reason for Stopping:         losartan (COZAAR) 50 mg tablet Comments:   Reason for Stopping:         polyethylene glycol (MIRALAX) 17 gram/dose powder Comments:   Reason for Stopping:         furosemide (Lasix) 20 mg tablet Comments:   Reason for Stopping:         losartan (COZAAR) 25 mg tablet Comments:   Reason for Stopping:         FLUoxetine (PROZAC) 20 mg capsule Comments:   Reason for Stopping:               Activity: Activity as tolerated with assistance  Diet: Diet as tolerated  Wound Care: None needed  Oxygen: Room air  Equipment: travel wheelchair recommended    Follow-up Appointments   Procedures    FOLLOW UP VISIT Appointment in: Other (Specify) Follow-up with home hospice per agency protocol. Follow-up with home hospice per agency protocol. Standing Status:   Standing     Number of Occurrences:   1     Order Specific Question:   Appointment in     Answer:    Other (Specify)       Signed By: Olga Son NP     July 30, 2021

## 2021-07-30 NOTE — PROGRESS NOTES
Problem: Anticipatory Grief  Goal: Explore reactions to and verbalize acceptance of impending loss  Description: Patient/family/caregiver will explore reactions to and verbalize acceptance of impending loss. Outcome: Progressing Towards Goal     Problem: Coping and Emotional Distress  Goal: Demonstrate acceptance of terminal illness and understanding of disease progression  Description: Patient/family/caregiver will demonstrate acceptance of terminal disease and understanding of disease progression while employing appropriate coping mechanisms. Outcome: Progressing Towards Goal     Problem: End of Life Process  Goal: Demonstrate understanding of end of life processes  Description: Patient/caregiver will understand end of life processes. Outcome: Progressing Towards Goal     Problem: Spiritual Evaluation  Goal: Identify beliefs/practices that support hospice experience  Description: Patient/family identify their beliefs/practices that impair Hospice experience. Patient/family identify their beliefs/practices that support Hospice experience. Patient coping identified. Spiritual distress identified and decreased with visit. Outcome: Progressing Towards Goal     Problem: Anticipatory Grief  Goal: Grief heard and acknowledged, anxiety reduced, patient coping identified, patient/family expressed gratitude  Outcome: Progressing Towards Goal     Problem: Pain  Goal: *Control of Pain  Outcome: Progressing Towards Goal  Goal: *PALLIATIVE CARE:  Alleviation of Pain  Outcome: Progressing Towards Goal     Problem: Falls - Risk of  Goal: *Absence of Falls  Description: Document Rodolfo Fall Risk and appropriate interventions in the flowsheet.   Outcome: Progressing Towards Goal  Note: Fall Risk Interventions:  Mobility Interventions: Bed/chair exit alarm, Patient to call before getting OOB         Medication Interventions: Bed/chair exit alarm, Patient to call before getting OOB, Teach patient to arise slowly    Elimination Interventions: Bed/chair exit alarm, Call light in reach              Problem: Patient Education: Go to Patient Education Activity  Goal: Patient/Family Education  Outcome: Progressing Towards Goal     Problem: Pressure Injury - Risk of  Goal: *Prevention of pressure injury  Description: Document Ramesh Scale and appropriate interventions in the flowsheet.   Outcome: Progressing Towards Goal  Note: Pressure Injury Interventions:  Sensory Interventions: Assess changes in LOC, Keep linens dry and wrinkle-free, Minimize linen layers, Pressure redistribution bed/mattress (bed type)    Moisture Interventions: Absorbent underpads, Maintain skin hydration (lotion/cream), Minimize layers    Activity Interventions: Assess need for specialty bed, Pressure redistribution bed/mattress(bed type)    Mobility Interventions: Float heels, HOB 30 degrees or less, Pressure redistribution bed/mattress (bed type)    Nutrition Interventions: Document food/fluid/supplement intake    Friction and Shear Interventions: Apply protective barrier, creams and emollients, HOB 30 degrees or less, Minimize layers                Problem: Patient Education: Go to Patient Education Activity  Goal: Patient/Family Education  Outcome: Progressing Towards Goal     Problem: General Wound Care  Goal: *Non-infected wound: Improvement of existing wound, absence of infection, and maintenance of skin integrity  Outcome: Progressing Towards Goal  Goal: Interventions  Outcome: Progressing Towards Goal

## 2021-07-31 VITALS
SYSTOLIC BLOOD PRESSURE: 120 MMHG | TEMPERATURE: 98 F | RESPIRATION RATE: 18 BRPM | HEART RATE: 80 BPM | DIASTOLIC BLOOD PRESSURE: 57 MMHG

## 2021-07-31 PROCEDURE — 74011250637 HC RX REV CODE- 250/637: Performed by: INTERNAL MEDICINE

## 2021-07-31 PROCEDURE — 0651 HSPC ROUTINE HOME CARE

## 2021-07-31 PROCEDURE — G0299 HHS/HOSPICE OF RN EA 15 MIN: HCPCS

## 2021-07-31 PROCEDURE — HOSPICE MEDICATION HC HH HOSPICE MEDICATION

## 2021-07-31 RX ADMIN — HALOPERIDOL 2 MG: 1 TABLET ORAL at 12:02

## 2021-07-31 RX ADMIN — MORPHINE SULFATE 10 MG: 10 SOLUTION ORAL at 02:14

## 2021-07-31 RX ADMIN — PANTOPRAZOLE SODIUM 40 MG: 40 TABLET, DELAYED RELEASE ORAL at 11:50

## 2021-07-31 RX ADMIN — CITALOPRAM HYDROBROMIDE 20 MG: 20 TABLET ORAL at 11:50

## 2021-07-31 NOTE — PROGRESS NOTES
1941  Received report from off going RN  Identified pt by name and date of birth. Routine level of care admitted for hospice dx of metastatic gastric cancer. Pt plans to remain at [facility] until 8/1/21  LOC will be reviewed often for signs of change in [address]   Plan of Care was reviewed. Safety measures such as tab alert, Posey bed alarm ,  bed in low/locked position , side rails x 2 door open and close to the nurses station are in place. Pt resting in bed. Helped pt to reposition with extra pillows     2315   Pt resting with eyes closed. No signs of distress   FLACC 0/10   All safety equipment in place. 0317 Pt resting with eyes closed. No signs of distress noted. FLACC 0/10  All safety measures in place     0517  Pt remains comfortable  FLACC 0/10   No signs of distress noted.    All safety measures in place

## 2021-07-31 NOTE — PROGRESS NOTES
1850-Report received from Jaron Duke RN. Patient identified by name and . Patient resting quietly in bed with eyes closed. No signs or symptoms of distress, pain, agitation/anxiety, or SOB noted at present. Bed locked and in lowest position, side rails up x 2, call bell within reach, tab alarm in place. No family present at bedside. Door open for continuous monitoring. -Patient admitted to [facility] on 2021 with a terminal diagnosis of Gastric cancer. Patient is Routine level of care. Patient is alert and oriented x 3. Patient able to get up with stand by assist. Appetite remains fair. Patient is continent of bowel and bladder. Patient will remain in [facility] under Routine LOC until 2021. Will continue to evaluate patient D/C plan for potential change of LOC. Patient has hx of gastric cancer and had been getting chemo therapy but is no longer receiving therapy and had recently been getting hospice services at home. Patient presented to the hospital this past hospital stay for weakness and dark-colored stools. Patient received blood transfusions during her hospitalization. Patient looking forward to spending end of life care in [facility] to her country. No immediate needs seen at this time. No CNA during this shift. 0-Patient resting quietly in bed with eyes closed. Respirations unlabored. No immediate needs seen. FLACC 0.    0025-Nurse entered into patient's room after patient pressed call light. Patient voiced wanting pillows adjusted on her back. No other needs voiced.     Report given to Nava Bedolla RN

## 2021-07-31 NOTE — PROGRESS NOTES
Problem: Pain  Goal: *Control of Pain  Outcome: Progressing Towards Goal  Goal: *PALLIATIVE CARE:  Alleviation of Pain  Outcome: Progressing Towards Goal  Note: Pt pain would be less then 4/10  Roxanol 10 mg po q 30 minutes prn          Problem: Falls - Risk of  Goal: *Absence of Falls  Description: Document Rodolfo Fall Risk and appropriate interventions in the flowsheet. Outcome: Progressing Towards Goal  Note: Fall Risk Interventions:  Mobility Interventions: Bed/chair exit alarm         Medication Interventions: Bed/chair exit alarm    Elimination Interventions: Bed/chair exit alarm              Problem: Pressure Injury - Risk of  Goal: *Prevention of pressure injury  Description: Document Ramesh Scale and appropriate interventions in the flowsheet.   Outcome: Progressing Towards Goal  Note: Pressure Injury Interventions:  Sensory Interventions: Assess changes in LOC, Keep linens dry and wrinkle-free, Minimize linen layers, Pressure redistribution bed/mattress (bed type)    Moisture Interventions: Absorbent underpads, Apply protective barrier, creams and emollients    Activity Interventions: Assess need for specialty bed    Mobility Interventions: Float heels    Nutrition Interventions: Document food/fluid/supplement intake    Friction and Shear Interventions: Apply protective barrier, creams and emollients, Lift sheet

## 2021-07-31 NOTE — PROGRESS NOTES
Report received from off-going Trevor Wilson RN  visual identification made, assumed care of pt. Pt resting quietly with eyes closed, no agitation or restlessness, no grimacing or groaning. Pt respirations unlabored. Tab alert in place, rails up x 2, bed in lowest position, safety maintained. FLACC 0. Discharge plan is for pt to remain at [facility] under routine level of care until discharge on 8/1/21. Will continue to evaluate pt discharge plan for potential changes. 0900 pt resting quietly, will administer po medications when she awakens    1100 pt continues to rest quietly, will administer po medications when pt awakens    1154 pt has two visitors at bedside. They are discussing things in another language, explained to her what medications administering, she took without problem. Pt cousin Maynor donaldson states she will be transporting pt to [place]. States she has a question for the . 1159 pt vomiting     1205 administered haldol crushed in water. Pt swallowed without problem. 1220 discussed discharge tomorrow and medications. Asked NP about medications. 1300 per pt's request gave cousin, Maynor donaldson, pt's prescriptions. She states she will fill them so pt has medications for the trip. 1500 pt resting quietly. [address] representative from [facility] care brought transport chair, pt signed for it. But asked what do I need that for.      1800 pt resting quietly    Report given to Dennie Hartigan RN

## 2021-07-31 NOTE — PROGRESS NOTES
3628 Patient report taken from [facility], RN and walking rounds completed. Patient identified by name and . Patient is Routine with diagnosis of metastasis from gastric cancer. We are treating symptoms of pain, anxiety and nausea. Patient's discharge plan is to go to [place] with family and live in an [facility] there. The plans are in process. The patient is resting quietly in the the bed with no signs of distress observed. Pain is 0/10 using none verbal scale. No signs of anxiety, SOB, nausea or vomit observed. The bed is low and locked with two bed rails up, bed exit alarm in place and tab alert in place. The door to the patient's room is left open for close observation. 0800- Patient is awake and is alert to person, place and time. She states that she does not like American food. Patient was offered many different foods but refused to eat. Patient denies pain, SOB, anxiety or nausea at this time. 1015- Patient took her AM medications and ate a few bites of yogurt. Patient states that she does not need to use the bathroom. Patient shifts weight in the bed and positions herself. 1200- Patient ate a few bites of the fruit and drank juice. Patient does not like cold drinks. Up to the bathroom to void with only stand by assist required. Patient does own ty care and washed her hands. Patient continues to deny pain, SOB, nausea or anxiety. Patient states that she is ready to get with her family. Ramon Parish, , states that the patient will be transferred to an [facility] in [place] on . He states that her family will be in on Saturday and will drive her down on . 1400- Patient is sleeping with no signs of distress. 1600- Patient continues to deny any pain, SOB, nausea or anxiety. Patient has been on the phone and states that she has fussed with family over her money. Patient took sips of her water. 1800- Patient continues to state that the American food is nasty.  She states that family will bring her food tomorrow that is worth eating. Encouraged patient to eat something with no success. Reported off to Lizandro Roberts RN.

## 2021-07-31 NOTE — PROGRESS NOTES
NEY met with Jennifer Neisha, her sister-in-law, Curtis Monaco (she arrived from [place] this morning) and Fabi's friend Michelle Zaldivar. Discharge plans discussed in detail. [facility] will be going to Fabi's home later today to begin gathering items that Jennifer Neisha woud like for her trip. [facility] will also be picking up Fabi's medications from [facility] as well. NEY ordered Jennifer Driver a 19\" transport chair per their request and this will be delivered to Fbai's room this afternoon. [facility] is planning to  Fabi tomorrow morning at 10: 00 to begin there journey to [place]. Fabi's discharge summary has been faxed to [address] [facility]. Above discussed with Fabi's niece/POA, Amos Horse. [facility] charge [facility], [facility].

## 2021-07-31 NOTE — PROGRESS NOTES
Problem: Anticipatory Grief  Goal: Explore reactions to and verbalize acceptance of impending loss  Description: Patient/family/caregiver will explore reactions to and verbalize acceptance of impending loss. Outcome: Progressing Towards Goal     Problem: Coping and Emotional Distress  Goal: Demonstrate acceptance of terminal illness and understanding of disease progression  Description: Patient/family/caregiver will demonstrate acceptance of terminal disease and understanding of disease progression while employing appropriate coping mechanisms. Outcome: Progressing Towards Goal   Patient is to transfer to [facility] on Sunday to be near to family. Problem: Pain  Goal: *Control of Pain  Outcome: Progressing Towards Goal     Problem: Falls - Risk of  Goal: *Absence of Falls  Description: Document Rodolfo Fall Risk and appropriate interventions in the flowsheet. Outcome: Progressing Towards Goal  Note: Fall Risk Interventions:  Mobility Interventions: Bed/chair exit alarm, Patient to call before getting OOB         Medication Interventions: Bed/chair exit alarm, Patient to call before getting OOB, Teach patient to arise slowly    Elimination Interventions: Bed/chair exit alarm, Call light in reach     Mrs. Isaacs calls for assist before transferring. Problem: Patient Education: Go to Patient Education Activity  Goal: Patient/Family Education  Outcome: Progressing Towards Goal     Problem: Pressure Injury - Risk of  Goal: *Prevention of pressure injury  Description: Document Ramesh Scale and appropriate interventions in the flowsheet.   Outcome: Progressing Towards Goal  Note: Pressure Injury Interventions:  Sensory Interventions: Assess changes in LOC, Keep linens dry and wrinkle-free, Minimize linen layers, Pressure redistribution bed/mattress (bed type)    Moisture Interventions: Absorbent underpads, Maintain skin hydration (lotion/cream), Minimize layers    Activity Interventions: Assess need for specialty bed, Pressure redistribution bed/mattress(bed type)    Mobility Interventions: Float heels, HOB 30 degrees or less, Pressure redistribution bed/mattress (bed type)    Nutrition Interventions: Document food/fluid/supplement intake    Friction and Shear Interventions: Apply protective barrier, creams and emollients, HOB 30 degrees or less, Minimize layers     Mrs. Frankie Osorio repositions self frequently in the bed.

## 2021-07-31 NOTE — PROGRESS NOTES
0100  Report received from AWA Jj. Pt identidified by name and   Pt resting quietly in bed with eyes closed. .  NO signs or symptoms of distress , pain, agitation or SOB noted. Bed low and locked. Bed alarm on, tab alerts in place. Side rails up x2 and door open       0214 Pt requesting pain medication . States she is having 9/10 pain in her stomach. 0254  Pt is resting with eyes closed. No signs of pain at this time. FLACC 0/10    All safety measures in place     0600 Pt resting with eyes closed. No signs of distress noted.    FLACC 0/10  All safety measures in place

## 2021-08-01 PROCEDURE — 0651 HSPC ROUTINE HOME CARE

## 2021-08-01 PROCEDURE — 74011250637 HC RX REV CODE- 250/637: Performed by: INTERNAL MEDICINE

## 2021-08-01 RX ADMIN — PANTOPRAZOLE SODIUM 40 MG: 40 TABLET, DELAYED RELEASE ORAL at 09:44

## 2021-08-01 RX ADMIN — CITALOPRAM HYDROBROMIDE 20 MG: 20 TABLET ORAL at 09:44

## 2021-08-01 NOTE — DISCHARGE INSTRUCTIONS
Activity: Activity as tolerated with assistance  Diet: Diet as tolerated  Wound Care: None needed  Oxygen: Room air  Equipment: travel wheelchair recommended

## 2021-08-01 NOTE — PROGRESS NOTES
Report received from off-going Omid Aguirre RN visual identification made, assumed care of pt. Pt resting quietly with eyes closed, no agitation or restlessness, no grimacing or groaning. Pt respirations unlabored. Tab alert in place, rails up x 2, bed in lowest position, safety maintained. FLACC 0. Discharge plan is for pt to remain at [facility] under routine level of care until discharge on 8/1/21. Will continue to evaluate pt discharge plan for potential changes. 5021 pt using call light, asked for assistance to bathroom, pt ambulated with standby assist. Pt grimacing but denies pain. 7996 pt assisted up to bathroom, pt states she had a bowel movement. Pt drank an Ensure without becoming nauseous, administered po medications whole. Physical assessment completed. Lung sounds clear, heart sounds regular, pt alert and oriented, abdomen soft with hypoactive bowel sounds. Pt voids in bathroom. Upper extremities warm with palpable pulses, lower extremities warm with 2+ pitting edema and palpable pulses. Discussed what pt wanted to take with her when discharged. Pt states she only wants her gown and underwear and shoes but nothing else. 1030 no family at bedside, they were scheduled to pick her up at 1030. Called director of [facility] in [place], no answer. 1139 pt attempting to make phone calls. When asked about her sister in [facility] her up,she shrugged her shoulders. 1155 spoke with Shannon Stone, she states she's running behind schedule but will be here with in the hour    1305 pt bed alarm going off but pt in bed not moving. 401 [facility] at bedside, said she brought food for pt to eat. Shannon Stone pointed out pt has increased edema to left arm 2+, which was not present this morning. Pt asking to eat before leaving on trip. 1340 pt ate chicken soup brought by Shannon Stone. Shahana Kidd report and explained what medications pt had received and what medications are for.  She states she understands. 1420 pt transferred to [place] car safely.

## 2021-08-01 NOTE — PROGRESS NOTES
Problem: End of Life Process  Goal: Demonstrate understanding of end of life processes  Description: Patient/caregiver will understand end of life processes. Outcome: Progressing Towards Goal     Problem: Pain  Goal: *Control of Pain  Outcome: Progressing Towards Goal     Problem: Falls - Risk of  Goal: *Absence of Falls  Description: Document Rodolfo Fall Risk and appropriate interventions in the flowsheet.   Outcome: Progressing Towards Goal  Note: Fall Risk Interventions:  Mobility Interventions: Bed/chair exit alarm         Medication Interventions: Bed/chair exit alarm    Elimination Interventions: Bed/chair exit alarm

## 2021-08-01 NOTE — PROGRESS NOTES
Problem: Anticipatory Grief  Goal: Explore reactions to and verbalize acceptance of impending loss  Description: Patient/family/caregiver will explore reactions to and verbalize acceptance of impending loss. Outcome: Progressing Towards Goal     Problem: Coping and Emotional Distress  Goal: Demonstrate acceptance of terminal illness and understanding of disease progression  Description: Patient/family/caregiver will demonstrate acceptance of terminal disease and understanding of disease progression while employing appropriate coping mechanisms. Outcome: Progressing Towards Goal     Problem: End of Life Process  Goal: Demonstrate understanding of end of life processes  Description: Patient/caregiver will understand end of life processes. Outcome: Progressing Towards Goal     Problem: Spiritual Evaluation  Goal: Identify beliefs/practices that support hospice experience  Description: Patient/family identify their beliefs/practices that impair Hospice experience. Patient/family identify their beliefs/practices that support Hospice experience. Patient coping identified. Spiritual distress identified and decreased with visit. Outcome: Progressing Towards Goal     Problem: Pain  Goal: *Control of Pain  Outcome: Progressing Towards Goal  Goal: *PALLIATIVE CARE:  Alleviation of Pain  Outcome: Progressing Towards Goal     Problem: Falls - Risk of  Goal: *Absence of Falls  Description: Document Rodolfo Fall Risk and appropriate interventions in the flowsheet.   Outcome: Progressing Towards Goal  Note: Fall Risk Interventions:  Mobility Interventions: Bed/chair exit alarm         Medication Interventions: Bed/chair exit alarm    Elimination Interventions: Bed/chair exit alarm              Problem: Patient Education: Go to Patient Education Activity  Goal: Patient/Family Education  Outcome: Progressing Towards Goal     Problem: Anticipatory Grief  Goal: Grief heard and acknowledged, anxiety reduced, patient coping identified, patient/family expressed gratitude  Outcome: Progressing Towards Goal     Problem: Pressure Injury - Risk of  Goal: *Prevention of pressure injury  Description: Document Ramesh Scale and appropriate interventions in the flowsheet.   Outcome: Progressing Towards Goal  Note: Pressure Injury Interventions:  Sensory Interventions: Assess changes in LOC, Keep linens dry and wrinkle-free, Minimize linen layers, Pressure redistribution bed/mattress (bed type)    Moisture Interventions: Absorbent underpads, Apply protective barrier, creams and emollients    Activity Interventions: Assess need for specialty bed    Mobility Interventions: Float heels    Nutrition Interventions: Document food/fluid/supplement intake    Friction and Shear Interventions: Apply protective barrier, creams and emollients, Lift sheet                Problem: Patient Education: Go to Patient Education Activity  Goal: Patient/Family Education  Outcome: Progressing Towards Goal     Problem: General Wound Care  Goal: *Non-infected wound: Improvement of existing wound, absence of infection, and maintenance of skin integrity  Outcome: Progressing Towards Goal  Goal: Interventions  Outcome: Progressing Towards Goal